# Patient Record
Sex: MALE | Race: BLACK OR AFRICAN AMERICAN | NOT HISPANIC OR LATINO | Employment: UNEMPLOYED | URBAN - METROPOLITAN AREA
[De-identification: names, ages, dates, MRNs, and addresses within clinical notes are randomized per-mention and may not be internally consistent; named-entity substitution may affect disease eponyms.]

---

## 2022-12-16 ENCOUNTER — HOSPITAL ENCOUNTER (EMERGENCY)
Facility: CLINIC | Age: 32
Discharge: PSYCHIATRIC OR SUBSTANCE USE FACILITY | End: 2022-12-19
Attending: EMERGENCY MEDICINE | Admitting: EMERGENCY MEDICINE
Payer: MEDICAID

## 2022-12-16 DIAGNOSIS — F29 PSYCHOSIS, UNSPECIFIED PSYCHOSIS TYPE (H): ICD-10-CM

## 2022-12-16 DIAGNOSIS — F19.10 DRUG ABUSE (H): ICD-10-CM

## 2022-12-16 LAB
BASOPHILS # BLD AUTO: 0 10E3/UL (ref 0–0.2)
BASOPHILS NFR BLD AUTO: 0 %
EOSINOPHIL # BLD AUTO: 0.1 10E3/UL (ref 0–0.7)
EOSINOPHIL NFR BLD AUTO: 1 %
ERYTHROCYTE [DISTWIDTH] IN BLOOD BY AUTOMATED COUNT: 15.9 % (ref 10–15)
HCT VFR BLD AUTO: 42.3 % (ref 40–53)
HGB BLD-MCNC: 14.3 G/DL (ref 13.3–17.7)
IMM GRANULOCYTES # BLD: 0 10E3/UL
IMM GRANULOCYTES NFR BLD: 0 %
LYMPHOCYTES # BLD AUTO: 1.6 10E3/UL (ref 0.8–5.3)
LYMPHOCYTES NFR BLD AUTO: 14 %
MCH RBC QN AUTO: 28.4 PG (ref 26.5–33)
MCHC RBC AUTO-ENTMCNC: 33.8 G/DL (ref 31.5–36.5)
MCV RBC AUTO: 84 FL (ref 78–100)
MONOCYTES # BLD AUTO: 0.5 10E3/UL (ref 0–1.3)
MONOCYTES NFR BLD AUTO: 5 %
NEUTROPHILS # BLD AUTO: 8.7 10E3/UL (ref 1.6–8.3)
NEUTROPHILS NFR BLD AUTO: 80 %
NRBC # BLD AUTO: 0 10E3/UL
NRBC BLD AUTO-RTO: 0 /100
PLATELET # BLD AUTO: 328 10E3/UL (ref 150–450)
RBC # BLD AUTO: 5.04 10E6/UL (ref 4.4–5.9)
WBC # BLD AUTO: 10.9 10E3/UL (ref 4–11)

## 2022-12-16 PROCEDURE — U0005 INFEC AGEN DETEC AMPLI PROBE: HCPCS | Performed by: EMERGENCY MEDICINE

## 2022-12-16 PROCEDURE — 99285 EMERGENCY DEPT VISIT HI MDM: CPT | Mod: 25

## 2022-12-16 PROCEDURE — C9803 HOPD COVID-19 SPEC COLLECT: HCPCS

## 2022-12-16 PROCEDURE — 258N000003 HC RX IP 258 OP 636: Performed by: EMERGENCY MEDICINE

## 2022-12-16 PROCEDURE — 93005 ELECTROCARDIOGRAM TRACING: CPT | Performed by: EMERGENCY MEDICINE

## 2022-12-16 PROCEDURE — 36415 COLL VENOUS BLD VENIPUNCTURE: CPT | Performed by: EMERGENCY MEDICINE

## 2022-12-16 PROCEDURE — 85025 COMPLETE CBC W/AUTO DIFF WBC: CPT | Performed by: EMERGENCY MEDICINE

## 2022-12-16 RX ORDER — SODIUM CHLORIDE 9 MG/ML
INJECTION, SOLUTION INTRAVENOUS CONTINUOUS
Status: DISCONTINUED | OUTPATIENT
Start: 2022-12-17 | End: 2022-12-19 | Stop reason: HOSPADM

## 2022-12-16 RX ADMIN — SODIUM CHLORIDE 1000 ML: 9 INJECTION, SOLUTION INTRAVENOUS at 23:49

## 2022-12-16 ASSESSMENT — ACTIVITIES OF DAILY LIVING (ADL): ADLS_ACUITY_SCORE: 33

## 2022-12-16 ASSESSMENT — ENCOUNTER SYMPTOMS
DYSURIA: 0
HEMATURIA: 0
DIARRHEA: 0
CONSTIPATION: 0
VOMITING: 0
DIFFICULTY URINATING: 0
NAUSEA: 0

## 2022-12-17 ENCOUNTER — TELEPHONE (OUTPATIENT)
Dept: BEHAVIORAL HEALTH | Facility: CLINIC | Age: 32
End: 2022-12-17

## 2022-12-17 LAB
ALBUMIN SERPL-MCNC: 3.7 G/DL (ref 3.5–5)
ALP SERPL-CCNC: 55 U/L (ref 45–120)
ALT SERPL W P-5'-P-CCNC: 19 U/L (ref 0–45)
AMPHETAMINES UR QL SCN: ABNORMAL
ANION GAP SERPL CALCULATED.3IONS-SCNC: 7 MMOL/L (ref 5–18)
APAP SERPL-MCNC: <3 UG/ML (ref 10–20)
AST SERPL W P-5'-P-CCNC: 26 U/L (ref 0–40)
ATRIAL RATE - MUSE: 75 BPM
BARBITURATES UR QL: ABNORMAL
BENZODIAZ UR QL: ABNORMAL
BILIRUB SERPL-MCNC: 0.6 MG/DL (ref 0–1)
BUN SERPL-MCNC: 13 MG/DL (ref 8–22)
CALCIUM SERPL-MCNC: 9.1 MG/DL (ref 8.5–10.5)
CANNABINOIDS UR QL SCN: ABNORMAL
CHLORIDE BLD-SCNC: 107 MMOL/L (ref 98–107)
CO2 SERPL-SCNC: 25 MMOL/L (ref 22–31)
COCAINE UR QL: ABNORMAL
CREAT SERPL-MCNC: 0.84 MG/DL (ref 0.7–1.3)
CREAT UR-MCNC: 596 MG/DL
DIASTOLIC BLOOD PRESSURE - MUSE: NORMAL MMHG
GFR SERPL CREATININE-BSD FRML MDRD: >90 ML/MIN/1.73M2
GLUCOSE BLD-MCNC: 104 MG/DL (ref 70–125)
INTERPRETATION ECG - MUSE: NORMAL
METHADONE UR QL SCN: ABNORMAL
OPIATES UR QL SCN: ABNORMAL
OXYCODONE UR QL: ABNORMAL
P AXIS - MUSE: 32 DEGREES
PCP UR QL SCN: ABNORMAL
POTASSIUM BLD-SCNC: 3.9 MMOL/L (ref 3.5–5)
PR INTERVAL - MUSE: 146 MS
PROT SERPL-MCNC: 6.8 G/DL (ref 6–8)
QRS DURATION - MUSE: 78 MS
QT - MUSE: 400 MS
QTC - MUSE: 446 MS
R AXIS - MUSE: 55 DEGREES
SARS-COV-2 RNA RESP QL NAA+PROBE: NEGATIVE
SODIUM SERPL-SCNC: 139 MMOL/L (ref 136–145)
SYSTOLIC BLOOD PRESSURE - MUSE: NORMAL MMHG
T AXIS - MUSE: 30 DEGREES
VENTRICULAR RATE- MUSE: 75 BPM

## 2022-12-17 PROCEDURE — 36415 COLL VENOUS BLD VENIPUNCTURE: CPT | Performed by: EMERGENCY MEDICINE

## 2022-12-17 PROCEDURE — 80053 COMPREHEN METABOLIC PANEL: CPT | Performed by: EMERGENCY MEDICINE

## 2022-12-17 PROCEDURE — 80143 DRUG ASSAY ACETAMINOPHEN: CPT | Performed by: EMERGENCY MEDICINE

## 2022-12-17 PROCEDURE — 90791 PSYCH DIAGNOSTIC EVALUATION: CPT

## 2022-12-17 PROCEDURE — 80307 DRUG TEST PRSMV CHEM ANLYZR: CPT | Performed by: EMERGENCY MEDICINE

## 2022-12-17 PROCEDURE — 250N000013 HC RX MED GY IP 250 OP 250 PS 637: Performed by: STUDENT IN AN ORGANIZED HEALTH CARE EDUCATION/TRAINING PROGRAM

## 2022-12-17 RX ADMIN — NICOTINE POLACRILEX 2 MG: 2 GUM, CHEWING ORAL at 21:06

## 2022-12-17 ASSESSMENT — COLUMBIA-SUICIDE SEVERITY RATING SCALE - C-SSRS
TOTAL  NUMBER OF ABORTED OR SELF INTERRUPTED ATTEMPTS LIFETIME: 3
LETHALITY/MEDICAL DAMAGE CODE MOST LETHAL ACTUAL ATTEMPT: MINOR PHYSICAL DAMAGE
4. HAVE YOU HAD THESE THOUGHTS AND HAD SOME INTENTION OF ACTING ON THEM?: YES
TOTAL  NUMBER OF ACTUAL ATTEMPTS LIFETIME: 1
TOTAL  NUMBER OF ABORTED OR SELF INTERRUPTED ATTEMPTS LIFETIME: YES
FIRST ATTEMPT DATE: 66459
LETHALITY/MEDICAL DAMAGE CODE MOST LETHAL POTENTIAL ATTEMPT: BEHAVIOR LIKELY TO RESULT IN INJURY BUT NOT LIKELY TO CAUSE DEATH
5. HAVE YOU STARTED TO WORK OUT OR WORKED OUT THE DETAILS OF HOW TO KILL YOURSELF? DO YOU INTEND TO CARRY OUT THIS PLAN?: YES
MOST LETHAL DATE: 66459
LETHALITY/MEDICAL DAMAGE CODE FIRST ACTUAL ATTEMPT: MINOR PHYSICAL DAMAGE
MOST RECENT DATE: 66458
TOTAL  NUMBER OF ACTUAL ATTEMPTS PAST 3 MONTHS: 1
2. HAVE YOU ACTUALLY HAD ANY THOUGHTS OF KILLING YOURSELF?: YES
TOTAL  NUMBER OF INTERRUPTED ATTEMPTS LIFETIME: NO
3. HAVE YOU BEEN THINKING ABOUT HOW YOU MIGHT KILL YOURSELF?: YES
6. HAVE YOU EVER DONE ANYTHING, STARTED TO DO ANYTHING, OR PREPARED TO DO ANYTHING TO END YOUR LIFE?: YES
LETHALITY/MEDICAL DAMAGE CODE FIRST POTENTIAL ATTEMPT: BEHAVIOR LIKELY TO RESULT IN INJURY BUT NOT LIKELY TO CAUSE DEATH
ATTEMPT LIFETIME: YES
1. IN THE PAST MONTH, HAVE YOU WISHED YOU WERE DEAD OR WISHED YOU COULD GO TO SLEEP AND NOT WAKE UP?: YES
TOTAL  NUMBER OF ABORTED OR SELF INTERRUPTED ATTEMPTS SINCE LAST CONTACT: 3
6. HAVE YOU EVER DONE ANYTHING, STARTED TO DO ANYTHING, OR PREPARED TO DO ANYTHING TO END YOUR LIFE?: YES
LETHALITY/MEDICAL DAMAGE CODE MOST RECENT ACTUAL ATTEMPT: MINOR PHYSICAL DAMAGE
4. HAVE YOU HAD THESE THOUGHTS AND HAD SOME INTENTION OF ACTING ON THEM?: YES
REASONS FOR IDEATION LIFETIME: MOSTLY TO END OR STOP THE PAIN (YOU COULDN'T GO ON LIVING WITH THE PAIN OR HOW YOU WERE FEELING)
5. HAVE YOU STARTED TO WORK OUT OR WORKED OUT THE DETAILS OF HOW TO KILL YOURSELF? DO YOU INTEND TO CARRY OUT THIS PLAN?: YES
LETHALITY/MEDICAL DAMAGE CODE MOST RECENT POTENTIAL ATTEMPT: BEHAVIOR LIKELY TO RESULT IN INJURY BUT NOT LIKELY TO CAUSE DEATH
TOTAL  NUMBER OF PREPARATORY ACTS PAST 3 MONTHS: 1
ATTEMPT PAST THREE MONTHS: YES
1. HAVE YOU WISHED YOU WERE DEAD OR WISHED YOU COULD GO TO SLEEP AND NOT WAKE UP?: YES
2. HAVE YOU ACTUALLY HAD ANY THOUGHTS OF KILLING YOURSELF?: YES
TOTAL  NUMBER OF PREPARATORY ACTS LIFETIME: 2
TOTAL  NUMBER OF ABORTED OR SELF INTERRUPTED ATTEMPTS PAST 3 MONTHS: YES
REASONS FOR IDEATION PAST MONTH: MOSTLY TO END OR STOP THE PAIN (YOU COULDN'T GO ON LIVING WITH THE PAIN OR HOW YOU WERE FEELING)

## 2022-12-17 ASSESSMENT — ACTIVITIES OF DAILY LIVING (ADL)
ADLS_ACUITY_SCORE: 35

## 2022-12-17 NOTE — ED NOTES
No emergency contacts noted in pt's file.  Writer called number pt provided to nurse for sister at [phone], however, call went right to voicemail.  Message left for family member to contact ED.  Chart indicates pt lives with sister.  ALPHONSE [place]

## 2022-12-17 NOTE — ED PROVIDER NOTES
EMERGENCY DEPARTMENT ENCOUNTER      NAME: Blue Hernandez Jr.  AGE: 32 year old male  YOB: 1990  MRN: [id number]  EVALUATION DATE & TIME: 12/16/2022 10:58 PM    PCP: No primary care provider on file.    ED PROVIDER: Mark Vasquez M.D.      Chief Complaint   Patient presents with     Altered Mental Status     Suicidal     Psychiatric Evaluation         FINAL IMPRESSION:  1. Psychosis, unspecified psychosis type (H)    2. Drug abuse (H)          ED COURSE & MEDICAL DECISION MAKING:    Pertinent Labs & Imaging studies reviewed. (See chart for details)  32 year old male presents to the Emergency Department for evaluation of psychosis.  Patient having worsening paranoia.  Feels like people are following him.  Did have a possible overdose yesterday.  Lab work-up is negative.  I do not think he took Tylenol.  He has no symptoms.  LFTs are normal.  Tylenol level is negative.  EKG is normal.  Patient medically cleared.  Seen by DEC .  They were able to talk to patient's sister as well as the patient.  At this time patient appears to be acutely psychotic.  Likely secondary to drug abuse.  Will need to be admitted.  Will find a mental health bed.  Patient will wait in the ER until a bed is available    11:01 PM I met with the patient to gather history and to perform my initial exam. I discussed the plan for care while in the Emergency Department. PPE: Face shield, N95, goggles  2:54 AM Spoke with DEC .     At the conclusion of the encounter I discussed the results of all of the tests and the disposition. The questions were answered. The patient or family acknowledged understanding and was agreeable with the care plan.         MEDICATIONS GIVEN IN THE EMERGENCY:  Medications   0.9% sodium chloride BOLUS (1,000 mLs Intravenous New Bag 12/16/22 2349)     Followed by   sodium chloride 0.9% infusion (has no administration in time range)       NEW PRESCRIPTIONS STARTED AT TODAY'S ER VISIT  New  "Prescriptions    No medications on file          =================================================================    HPI    Patient information was obtained from: patient    Use of : N/A         Blue Hernandez Jr. is a 32 year old male with no pertinent history who presents to this ED via self walk-in for evaluation of altered mental status.     Patient states that since the week of 10/20, he thinks that someone has been following him. He states that he doesn't know who is following him but he feels scared. He says that no one believes that someone is following him and it's \"driving him crazy.\" Yesterday at 10:30-11:30 PM, he took about 80 tablets of 30 mg Fentanyl. He says he had intentions of suicidal ideation. After taking the pills, he says he was sleepy after the incident. He states that he has been buying fentanyl on the street for the past 6 years (he is a daily user). He also says he uses cocaine as well. He denies nausea, vomiting, and change in bowel and urinary habits. There were no other concerns/complaints at this time.    SHx: He lives with his sister.      REVIEW OF SYSTEMS   Review of Systems   Gastrointestinal: Negative for constipation, diarrhea, nausea and vomiting.   Genitourinary: Negative for difficulty urinating, dysuria and hematuria.   Psychiatric/Behavioral: Positive for suicidal ideas.   All other systems reviewed and are negative.       PAST MEDICAL HISTORY:  History reviewed. No pertinent past medical history.    PAST SURGICAL HISTORY:  History reviewed. No pertinent surgical history.        CURRENT MEDICATIONS:    Current Facility-Administered Medications   Medication     sodium chloride 0.9% infusion     No current outpatient medications on file.         ALLERGIES:  No Known Allergies    FAMILY HISTORY:  History reviewed. No pertinent family history.    SOCIAL HISTORY:   Social History     Socioeconomic History     Marital status: Single       VITALS:  /72   " "Pulse 82   Temp 98.2  F (36.8  C) (Temporal)   Resp 18   Ht 1.778 m (5' 10\")   Wt 99.8 kg (220 lb)   SpO2 93%   BMI 31.57 kg/m      PHYSICAL EXAM    Physical Exam  Vitals and nursing note reviewed.   Constitutional:       General: He is not in acute distress.     Appearance: He is not diaphoretic.   HENT:      Head: Atraumatic.   Eyes:      General: No scleral icterus.     Pupils: Pupils are equal, round, and reactive to light.   Cardiovascular:      Rate and Rhythm: Normal rate and regular rhythm.      Heart sounds: Normal heart sounds.   Pulmonary:      Effort: No respiratory distress.      Breath sounds: Normal breath sounds.   Abdominal:      Palpations: Abdomen is soft.      Tenderness: There is no abdominal tenderness.   Musculoskeletal:         General: No tenderness.   Lymphadenopathy:      Cervical: No cervical adenopathy.   Skin:     General: Skin is warm.      Findings: No rash.           LAB:  All pertinent labs reviewed and interpreted.  Labs Ordered and Resulted from Time of ED Arrival to Time of ED Departure   ACETAMINOPHEN LEVEL - Abnormal       Result Value    Acetaminophen <3.0 (*)    CBC WITH PLATELETS AND DIFFERENTIAL - Abnormal    WBC Count 10.9      RBC Count 5.04      Hemoglobin 14.3      Hematocrit 42.3      MCV 84      MCH 28.4      MCHC 33.8      RDW 15.9 (*)     Platelet Count 328      % Neutrophils 80      % Lymphocytes 14      % Monocytes 5      % Eosinophils 1      % Basophils 0      % Immature Granulocytes 0      NRBCs per 100 WBC 0      Absolute Neutrophils 8.7 (*)     Absolute Lymphocytes 1.6      Absolute Monocytes 0.5      Absolute Eosinophils 0.1      Absolute Basophils 0.0      Absolute Immature Granulocytes 0.0      Absolute NRBCs 0.0     DRUGS OF ABUSE 1+ PANEL, URINE ([facility]) - Abnormal    Amphetamines Urine Screen Negative      Benzodiazepines Urine Screen Negative      Opiates Urine Screen Negative      PCP Urine Screen Negative      Cannabinoids Urine Screen " Positive (*)     Barbiturates Urine Screen Negative      Cocaine Urine Screen Positive (*)     Methadone Urine Screen Negative      Oxycodone Urine Screen Negative      Creatinine Urine mg/dL 596     COMPREHENSIVE METABOLIC PANEL - Normal    Sodium 139      Potassium 3.9      Chloride 107      Carbon Dioxide (CO2) 25      Anion Gap 7      Urea Nitrogen 13      Creatinine 0.84      Calcium 9.1      Glucose 104      Alkaline Phosphatase 55      AST 26      ALT 19      Protein Total 6.8      Albumin 3.7      Bilirubin Total 0.6      GFR Estimate >90     COVID-19 VIRUS (CORONAVIRUS) BY PCR - Normal    SARS CoV2 PCR Negative         RADIOLOGY:  Reviewed all pertinent imaging. Please see official radiology report.  No orders to display     EKG:    Performed at: 2327  Impression: Normal EKG.  No previous  Normal sinus rhythm ventricular to 75.  .  QRS 78.  QTc 446.    I have independently reviewed and interpreted the EKG(s) documented above.    I, Yajaira Ann, am serving as a scribe to document services personally performed by Dr. Mark Vasquez, based on my observation and the provider's statements to me. I, Mark Vasquez MD attest that Yajaira Ann is acting in a scribe capacity, has observed my performance of the services and has documented them in accordance with my direction.    Mark Vasquez M.D.  Emergency Medicine  [facility] EMERGENCY ROOM  [address] [address] [phone]  Dept: [phone]     Mark Vasquez MD  12/17/22 0414

## 2022-12-17 NOTE — PLAN OF CARE
Blue Hernandez Jr.  December 17, 2022  Plan of Care Hand-off Note     Patient Care Path: Inpatient Mental Health (with CD component)     Plan for Care:     Inpatient mental health with CD component for patient.  Patient has YOVANNY.  Patient however did write a suicide letter to father over last several days.  Patient's sister reports the patient's behavior is getting increasingly concerning.   For example, he was nailing the doors and windows of sister's home shut the other day.  Sister has a 5 year old child in home.   Patient's drug use is escalating.   There is family hx of schizophrenia (aunt) and possible, but undiagnosed, bipolar dx in mother.  Both patient biological parents have YOVANNY.       Critical Safety Issues: Patient reports he is afraid of being uncomfortable or in painful emotional withdrawals. Pt denies past seizures.  He says he has developed dependency on street drugs (Fentanyl, cocaine, cannabis).     Patient is convinced he is being followed by unknown persons.  Sister says patient has become increasingly paranoid.   Patient has NOT shown any aggression in Ed     Overview:  This patient is a child/adolescent: No    This patient has additional special visitor precautions: No    Legal Status: Voluntary    Contacts:   Sister Kyra     Psychiatry Consult:  Psychiatry Consult not requested because not seen as needed at this time     Updated Attending Provider regarding plan of care.    Jenn Rivera, AYLEENSW

## 2022-12-17 NOTE — ED TRIAGE NOTES
"Pt presents to the ED with c/o being followed. Pt has been followed for 2 months now. Pt feels like he is hearing things. Pt does use \"cocaine and perc 30\". Per pt - he took 80 \"perc 30\"  yesterday in attempt to end his life.       "

## 2022-12-17 NOTE — PHARMACY-ADMISSION MEDICATION HISTORY
Pharmacy Note - Admission Medication History  Pertinent Provider Information:    Patient said he doesn't take any medications.      ______________________________________________________________________    Prior To Admission (PTA) med list completed and updated in EMR.       No outpatient medications have been marked as taking for the 12/16/22 encounter (Hospital Encounter).       Information source(s): Patient  Method of interview communication: in-person      Patient was asked about OTC/herbal products specifically.  PTA med list reflects this.      Allergies were reviewed, assessed, and updated with the patient.      Patient does not use any multi-dose medications prior to admission.    The information provided in this note is only as accurate as the sources available at the time of the update(s).    Thank you for the opportunity to participate in the care of this patient.    Claudia Oconnell RPH  12/17/2022 1:15 PM

## 2022-12-17 NOTE — ED NOTES
[facility] ED Mental Health Handoff Note:     Assuming care from: Dr Opal Jean    Brief HPI: 32 year old male signed out to me by the above provider. See initial ED Provider note for full details of the presentation.   In brief, patient presents for evaluation of psychosis with worsening paranoia.  Patient overdosed on fentanyl he obtained on the street 12/16, he has been medically cleared.       Acute psychosis, paranoia, DEC saw yesterday, nothing in past 8 hours    Home meds reviewed and ordered/administered: Yes  Medically stable for inpatient mental health admission: Yes.  Evaluated by mental health: Yes. The recommendation is for inpatient mental health treatment. Bed search in process  Safety concerns: At the time I received sign out, there were no safety concerns.    Hold Status:  Active Orders   N/A       ED Course:  There were no significant events during my shift.  Ordered patient some nicotine gum.    Patient was signed out to the oncoming provider, Dr. Mark Vasquez at shift change    Impression:    ICD-10-CM    1. Psychosis, unspecified psychosis type (H)  F29       2. Drug abuse (H)  F19.10           Plan:    1. Awaiting inpatient mental health admission/transfer.    Ceasar Lewis MD  [facility] EMERGENCY ROOM  [facility] [facility] [address] [phone]                 Ceasar Lewis MD  12/17/22 6330

## 2022-12-17 NOTE — ED PROVIDER NOTES
ED Behavioral Health Patient Transition of Care Note    Assuming care from:  Kaleb Vasquez MD    Brief history: Patient presents for evaluation of psychosis with worsening paranoia.  Patient overdosed on fentanyl he obtained on the street yesterday, he has been medically cleared.    Any outstanding medical concerns:  awaiting lab results - N/A, awaiting imaging results - N/A, reevaluate after treatment - N/A and risk for withdrawal syndrome    Has SW seen the patient:  yes    Is the patient on a hold:  voluntary but holdable    Current plan for disposition:  SW attempting to find a bed    Safety concerns:  pt has been chemically restrained with N/A, pt has been in physical restraints and pt has been aggressive or combative    Dietary restrictions:  None, pt can eat and drink ad lubna    Have daily medications been ordered:  None    Significant events during shift:  None         1. Psychosis, unspecified psychosis type (H)    2. Drug abuse (H)               Opal Jean MD  12/17/22 0587

## 2022-12-17 NOTE — TELEPHONE ENCOUNTER
R:  Patient cleared and ready for behavioral bed placement: Yes       No appropriate beds currently available within the  system. Bed search update 7:00am-330pm    No availabilioty at [facility], [facility], [facility], [place], or [facility].     Pt to remain on worklist pending bed availability for review

## 2022-12-17 NOTE — TELEPHONE ENCOUNTER
S: ALPHONSE Gates  Jenn calling at 0257AM about a 32 year old/Male presenting with Paranoia and SI    B: Pt arrived via Self . Presenting problem, stressors: Pt was nailing the doors and windows shut as he is paranoid that people are going to harm him. Pt lives with sister, who report that pt had written a suicide letter to dad. Pt reports he intentionally overdosed on 80 pills of fentanyl yesterday.     Pt endorses SI, no plan  Pt denies SIB  Pt denies HI   Pt endorses auditory hallucinations .     Pt affect in ED: calm cooperative  Pt Dx: No prior MH history  Previous [facility] hx? No  Hx of suicide attempt? No    Hx of aggression/violence, sexual offences, legal concerns, or Epic care plan? describe: No  Current concerns for aggression this visit? No    Pt is not prescribed medication.   Is patient medication compliant? N/A  Pt denies OP services   CD concerns: Actively using/consuming fentanyl, cocaine and cannabis  Acute or chronic medical concerns: No    Does Pt present with specific needs, assistive devices, or exclusionary criteria? None    Does pt have a history of Civil Commitment? No  Is Pt their own guardian? Yes  Pt is ambulatory  Pt is able to perform ADLs independently      A: Pt meets criteria for review for IP admission. Pt is Voluntary  Preferred placement: [facility]    COVID:Negative  Utox: Positive for Cannabis and cocaine   CMP: WNL  CBC: Abnormalities: RDW 15.9 / Abs Neutrohpils 8  HCG: N/A    R: Patient cleared and ready for behavioral bed placement: Yes  Pt placed on IPMH worklist? Yes     No appropriate beds currently available within the  system. Bed search update @        [facility]: @ cap per website. Per Karli @ 00:07 they do not have beds available tonight     [facility]: @ cap per website      [facility]: @ cap per website. Per previous call made by this writer, facility closed until at least Monday d/t Covid outbreak.       [facility]: @ cap per website      [facility]: @  cap per website      [facility]: @ cap per website    Pt remains on wait list pending appropriate placement availability

## 2022-12-17 NOTE — ED NOTES
"PT states there are people following him in a car when he goes out of the house everyday. The PT states he is unable to identify who they are because \"they never get close enough for me to see their faces our the license plate on the car\". The The writer asked the PT does he have reason to believe people would want to harm him and he stated \"not that I know of\". He stated he lives at home with his sister whom he feels safe around. He stated recently he has called the police due to hearing someone walking in what he thinks is the attic of the home. He has called the police a few times and he stated the police tell him perhaps it is \"animals\" in the attic. The PT stated his sister denies hearing things in the attic. He admits to taking 10-20 pills of street fentanyl a day as well as using cocaine daily. The PT denies marijuana or alcohol use. The PT states he took  80 pills of fentanyl yesterday in an attempt to end his life.   "
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
## 2022-12-17 NOTE — ED NOTES
Pt roomed. Charge RN informed of the need for a 1:1. The writer will remain with the patient until the charge RN is able to find a 1:1 tech to sit in the room.

## 2022-12-18 ENCOUNTER — TELEPHONE (OUTPATIENT)
Dept: BEHAVIORAL HEALTH | Facility: CLINIC | Age: 32
End: 2022-12-18

## 2022-12-18 ASSESSMENT — ACTIVITIES OF DAILY LIVING (ADL)
ADLS_ACUITY_SCORE: 35

## 2022-12-18 NOTE — ED NOTES
BERKLEY Ferrara  connected with patient via [facility].  Breakfast ordered.  Melita aSuer RN 12/18/2022 9:55 AM

## 2022-12-18 NOTE — ED NOTES
Per provider patient was prescribed nicotine gum and was allowed to use  to charge his video game in his room.

## 2022-12-18 NOTE — ED NOTES
Patient up to use bathroom. Patient states that he is wondering what is going on and what the plan is. He is concerned and feels forgotten about and is worried about getting discharged and going home and the issues continuing about seeing and hearing events. Patient is also requesting to go smoke. Provider will be notified about nicotine needs and charge nurse will be made aware of patients concerns and future plan.
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
## 2022-12-18 NOTE — ED PROVIDER NOTES
2:12 PM.  Psychiatric patient signed out to me.  Patient presenting with paranoia and psychosis which is still quite severe.  Patient overdosed on street fentanyl but now medically cleared.  DEC  is looking for a psychiatric bed for admission.  Patient remains voluntary but holdable.  If the patient is still here tonight, patient will be signed out to the night ED physician at 10 PM.  Drug screen positive for cannabinoids and cocaine.     Mitchell Lopez MD  12/18/22 1413       Mitchell Lopez MD  12/18/22 1911

## 2022-12-18 NOTE — ED NOTES
[facility] ED Mental Health Handoff Note:     Assuming care from: Dr Ceasar Lewis    Brief HPI: 32 year old male signed out to me by the above provider. See initial ED Provider note for full details of the presentation.   In brief, patient presents for evaluation of psychosis with worsening paranoia.  Patient overdosed on fentanyl he obtained on the street 12/16, he has been medically cleared.      I, Miley James, am serving as a scribe to document services personally performed by Dr. Vasquez, based on my observations and the provider's statements to me.  I, Dr. Vasquez, attest that Miley James is acting in a scribe capacity, has observed my performance of the services and has documented them in accordance with my direction.    Home meds reviewed and ordered/administered: Yes  Medically stable for inpatient mental health admission: Yes.  Evaluated by mental health: Yes. The recommendation is for inpatient mental health treatment. Bed search in process  Safety concerns: At the time I received sign out, there were no safety concerns.    Hold Status:  Active Orders   N/A       Labs/Imaging:   Results for orders placed or performed during the hospital encounter of 12/16/22 (from the past 24 hour(s))   ECG 12-LEAD WITH MUSE ([facility])     Status: None    Collection Time: 12/16/22 11:27 PM   Result Value Ref Range    Systolic Blood Pressure  mmHg    Diastolic Blood Pressure  mmHg    Ventricular Rate 75 BPM    Atrial Rate 75 BPM    CT Interval 146 ms    QRS Duration 78 ms     ms    QTc 446 ms    P Axis 32 degrees    R AXIS 55 degrees    T Axis 30 degrees    Interpretation ECG       Sinus rhythm  Normal ECG  No previous ECGs available  Confirmed by SEE ED PROVIDER NOTE FOR, ECG INTERPRETATION ([id number]),  ROXANNA CHANDLER ([id number]) on 12/17/2022 1:28:24 AM     CBC with platelets differential     Status: Abnormal    Collection Time: 12/16/22 11:46 PM    Narrative    The following orders were created for panel order CBC  with platelets differential.  Procedure                               Abnormality         Status                     ---------                               -----------         ------                     CBC with platelets and d...[[id number]]  Abnormal            Final result                 Please view results for these tests on the individual orders.   CBC with platelets and differential     Status: Abnormal    Collection Time: 12/16/22 11:46 PM   Result Value Ref Range    WBC Count 10.9 4.0 - 11.0 10e3/uL    RBC Count 5.04 4.40 - 5.90 10e6/uL    Hemoglobin 14.3 13.3 - 17.7 g/dL    Hematocrit 42.3 40.0 - 53.0 %    MCV 84 78 - 100 fL    MCH 28.4 26.5 - 33.0 pg    MCHC 33.8 31.5 - 36.5 g/dL    RDW 15.9 (H) 10.0 - 15.0 %    Platelet Count 328 150 - 450 10e3/uL    % Neutrophils 80 %    % Lymphocytes 14 %    % Monocytes 5 %    % Eosinophils 1 %    % Basophils 0 %    % Immature Granulocytes 0 %    NRBCs per 100 WBC 0 <1 /100    Absolute Neutrophils 8.7 (H) 1.6 - 8.3 10e3/uL    Absolute Lymphocytes 1.6 0.8 - 5.3 10e3/uL    Absolute Monocytes 0.5 0.0 - 1.3 10e3/uL    Absolute Eosinophils 0.1 0.0 - 0.7 10e3/uL    Absolute Basophils 0.0 0.0 - 0.2 10e3/uL    Absolute Immature Granulocytes 0.0 <=0.4 10e3/uL    Absolute NRBCs 0.0 10e3/uL   Asymptomatic COVID-19 Virus (Coronavirus) by PCR Nasopharyngeal     Status: Normal    Collection Time: 12/16/22 11:47 PM    Specimen: Nasopharyngeal; Swab   Result Value Ref Range    SARS CoV2 PCR Negative Negative    Narrative    Testing was performed using the Xpert Xpress SARS-CoV-2 Assay on the Cepheid Gene-Xpert Instrument Systems. Additional information about this Emergency Use Authorization (EUA) assay can be found via the Lab Guide. This test should be ordered for the detection of SARS-CoV-2 in individuals who meet SARS-CoV-2 clinical and/or epidemiological criteria as well as from individuals without symptoms or other reasons to suspect COVID-19. Test performance for asymptomatic  patients has only been established in anterior nasal swab specimens. This test is for in vitro diagnostic use under the FDA EUA for laboratories certified under CLIA to perform high complexity testing. This test has not been FDA cleared or approved. A negative result does not rule out the presence of PCR inhibitors in the specimen or target RNA concentration below the limit of detection for the assay. The possibility of a false negative should be considered if the patient's recent exposure or clinical presentation suggests COVID-19. This test was validated by the [facility]. This laboratory is certified under the Clinical Laboratory Improvement Amendments (CLIA) as qualified to perform high complexity laboratory testing.     Comprehensive metabolic panel     Status: Normal    Collection Time: 12/17/22  1:17 AM   Result Value Ref Range    Sodium 139 136 - 145 mmol/L    Potassium 3.9 3.5 - 5.0 mmol/L    Chloride 107 98 - 107 mmol/L    Carbon Dioxide (CO2) 25 22 - 31 mmol/L    Anion Gap 7 5 - 18 mmol/L    Urea Nitrogen 13 8 - 22 mg/dL    Creatinine 0.84 0.70 - 1.30 mg/dL    Calcium 9.1 8.5 - 10.5 mg/dL    Glucose 104 70 - 125 mg/dL    Alkaline Phosphatase 55 45 - 120 U/L    AST 26 0 - 40 U/L    ALT 19 0 - 45 U/L    Protein Total 6.8 6.0 - 8.0 g/dL    Albumin 3.7 3.5 - 5.0 g/dL    Bilirubin Total 0.6 0.0 - 1.0 mg/dL    GFR Estimate >90 >60 mL/min/1.73m2   Acetaminophen level     Status: Abnormal    Collection Time: 12/17/22  1:17 AM   Result Value Ref Range    Acetaminophen <3.0 (L) 10.0 - 20.0 ug/mL   Urine Drugs of Abuse Screen Panel 1+ - Drug Screen plus Methadone     Status: Abnormal    Collection Time: 12/17/22  1:17 AM    Narrative    The following orders were created for panel order Urine Drugs of Abuse Screen Panel 1+ - Drug Screen plus Methadone.  Procedure                               Abnormality         Status                     ---------                                -----------         ------                     Drugs of Abuse 1+ Panel,...[[id number]]  Abnormal            Final result                 Please view results for these tests on the individual orders.   Drugs of Abuse 1+ Panel, Urine ([facility] Only)     Status: Abnormal    Collection Time: 12/17/22  1:17 AM   Result Value Ref Range    Amphetamines Urine Screen Negative Screen Negative    Benzodiazepines Urine Screen Negative Screen Negative    Opiates Urine Screen Negative Screen Negative    PCP Urine Screen Negative Screen Negative    Cannabinoids Urine Screen Positive (A) Screen Negative    Barbiturates Urine Screen Negative Screen Negative    Cocaine Urine Screen Positive (A) Screen Negative    Methadone Urine Screen Negative Screen Negative    Oxycodone Urine Screen Negative Screen Negative    Creatinine Urine mg/dL 596 mg/dL    Narrative    Drug                           Screening Threshold    Amphetamines                    1000 ng/mL  Benzodiazepine                   200 ng/mL  Opiates                          300 ng/mL  Phencyclidine                     25 ng/mL  THC Metabolite                    50 ng/mL  Barbiturates                     200 ng/mL  Cocaine Metabolite               150 ng/mL  Methadone                        300 ng/mL  Oxycodone                        100 ng/mL    Screening results are to be used only for medical purposes.  Unconfirmed screening results are not to be used for non-  medical purposes.         ED Meds:  Medications   0.9% sodium chloride BOLUS (1,000 mLs Intravenous New Bag 12/16/22 0851)     Followed by   sodium chloride 0.9% infusion ( Intravenous Not Given 12/17/22 7859)   nicotine (NICORETTE) gum 2 mg (2 mg Buccal Given 12/17/22 5678)     No orders to display       ED Course:       There were no significant events during my shift.    Patient was signed out to the oncoming provider, Dr. Opal Jean at shift change    Impression:    ICD-10-CM    1. Psychosis, unspecified  psychosis type (H)  F29       2. Drug abuse (H)  F19.10           Plan:    1. Awaiting inpatient mental health admission/transfer.        Mark Vasquez MD  [facility] EMERGENCY ROOM  [address] [facility] [id number]  [phone]                   Mark Vasquez MD  12/18/22 0615

## 2022-12-18 NOTE — TELEPHONE ENCOUNTER
No appropriate beds currently available within the  system. Bed search update ([facility]) @ 12:40AM:       [facility]: @ cap per website. Per call @ 00:06 they are @ capacity.   [facility]: @ cap per website      [facility]: @ cap per website. Per Lina @ 00:07 they are full and closed until 12/20 d/t Covid.      [facility]: @ cap per website      [facility]: @ cap per website      [facility]: @ cap per website  [facility]: @ cap per website and has extensive wait list, per previous calls made by this writer.       [facility]: @ cap per website    Pt remains on work list until appropriate placement is available

## 2022-12-18 NOTE — TELEPHONE ENCOUNTER
R:  No appropriate beds within [facility].  Bed Search update within [facility] 8:50PM  [facility]- No beds available- Spoke to [facility].  No beds.  [facility]-No beds available  [facility]-No beds available.  Spoke to [facility].  No beds until at least Tuesday.    [facility]-No beds available  [facility]- No beds available  [facility]-No beds available  [facility]-No beds available  [facility]-No beds available     Pt remains on work list awaiting appropriate placement.

## 2022-12-18 NOTE — ED NOTES
[facility] ED Mental Health Handoff Note:     Assuming care from: Dr Mark Vasquez    Brief HPI: 32 year old male signed out to me by the above provider. See initial ED Provider note for full details of the presentation.   In brief, patient presents for evaluation of psychosis with worsening paranoia.  Patient overdosed on fentanyl he obtained on the street 12/16, he has been medically cleared.    I, Iam Lane, am serving as a scribe to documentservices personally performed by Dr. Opal Jean MD, based on my observation and the provider's statements to me. I, Opal Jean MD, attest that Iam Lane is acting in a scribe capacity, has observed my performance of the services and has documented them in accordance with my direction.    Home meds reviewed and ordered/administered: Yes  Medically stable for inpatient mental health admission: Yes.  Evaluated by mental health: Yes. The recommendation is for inpatient mental health treatment. Bed search in process  Safety concerns: At the time I received sign out, there were no safety concerns.    Hold Status:  Active Orders   N/A       Labs/Imaging:   No results found for this visit on 12/16/22 (from the past 24 hour(s)).      ED Meds:  Medications   0.9% sodium chloride BOLUS (1,000 mLs Intravenous New Bag 12/16/22 0097)     Followed by   sodium chloride 0.9% infusion ( Intravenous Not Given 12/17/22 8192)   nicotine (NICORETTE) gum 2 mg (2 mg Buccal Given 12/17/22 1126)     No orders to display       ED Course:     10:44 AM Discussed with Jos[facility] with [facility]. He says patient is still paranoid and has poor insight. He is still agreeable to admission. For validation would still recommend admission.    There were no significant events during my shift.    Patient was signed out to the oncoming provider, Dr. Mitchell Lopez at shift change    Impression:    ICD-10-CM    1. Psychosis, unspecified psychosis type (H)  F29       2. Drug abuse (H)   F19.10           Plan:    1. Awaiting inpatient mental health admission/transfer.    Opal Jean MD  [facility] EMERGENCY ROOM  [facility] [facility] [id number] [phone]                   Opal Jean MD  12/18/22 0563

## 2022-12-19 ENCOUNTER — HOSPITAL ENCOUNTER (INPATIENT)
Facility: CLINIC | Age: 32
LOS: 3 days | Discharge: HOME | End: 2022-12-22
Attending: PSYCHIATRY & NEUROLOGY | Admitting: PSYCHIATRY & NEUROLOGY
Payer: MEDICAID

## 2022-12-19 VITALS
TEMPERATURE: 98.2 F | DIASTOLIC BLOOD PRESSURE: 64 MMHG | HEIGHT: 70 IN | WEIGHT: 220 LBS | HEART RATE: 80 BPM | BODY MASS INDEX: 31.5 KG/M2 | SYSTOLIC BLOOD PRESSURE: 113 MMHG | OXYGEN SATURATION: 96 % | RESPIRATION RATE: 16 BRPM

## 2022-12-19 DIAGNOSIS — F14.959: Primary | ICD-10-CM

## 2022-12-19 PROCEDURE — 250N000013 HC RX MED GY IP 250 OP 250 PS 637: Performed by: NURSE PRACTITIONER

## 2022-12-19 PROCEDURE — 124N000002 HC R&B MH UMMC

## 2022-12-19 PROCEDURE — 99223 1ST HOSP IP/OBS HIGH 75: CPT | Mod: AI | Performed by: NURSE PRACTITIONER

## 2022-12-19 RX ORDER — IBUPROFEN 600 MG/1
600 TABLET, FILM COATED ORAL EVERY 6 HOURS PRN
Status: DISCONTINUED | OUTPATIENT
Start: 2022-12-19 | End: 2022-12-22 | Stop reason: HOSPADM

## 2022-12-19 RX ORDER — OLANZAPINE 5 MG/1
5 TABLET ORAL AT BEDTIME
Status: DISCONTINUED | OUTPATIENT
Start: 2022-12-19 | End: 2022-12-21

## 2022-12-19 RX ORDER — LOPERAMIDE HCL 2 MG
2 CAPSULE ORAL 4 TIMES DAILY PRN
Status: DISCONTINUED | OUTPATIENT
Start: 2022-12-19 | End: 2022-12-22 | Stop reason: HOSPADM

## 2022-12-19 RX ORDER — OLANZAPINE 10 MG/1
10 TABLET ORAL 3 TIMES DAILY PRN
Status: DISCONTINUED | OUTPATIENT
Start: 2022-12-19 | End: 2022-12-22 | Stop reason: HOSPADM

## 2022-12-19 RX ORDER — MAGNESIUM HYDROXIDE/ALUMINUM HYDROXICE/SIMETHICONE 120; 1200; 1200 MG/30ML; MG/30ML; MG/30ML
30 SUSPENSION ORAL EVERY 4 HOURS PRN
Status: DISCONTINUED | OUTPATIENT
Start: 2022-12-19 | End: 2022-12-22 | Stop reason: HOSPADM

## 2022-12-19 RX ORDER — ACETAMINOPHEN 325 MG/1
650 TABLET ORAL EVERY 4 HOURS PRN
Status: DISCONTINUED | OUTPATIENT
Start: 2022-12-19 | End: 2022-12-22 | Stop reason: HOSPADM

## 2022-12-19 RX ORDER — AMOXICILLIN 250 MG
1 CAPSULE ORAL 2 TIMES DAILY PRN
Status: DISCONTINUED | OUTPATIENT
Start: 2022-12-19 | End: 2022-12-22 | Stop reason: HOSPADM

## 2022-12-19 RX ORDER — ONDANSETRON 4 MG/1
4 TABLET, ORALLY DISINTEGRATING ORAL EVERY 6 HOURS PRN
Status: DISCONTINUED | OUTPATIENT
Start: 2022-12-19 | End: 2022-12-22 | Stop reason: HOSPADM

## 2022-12-19 RX ORDER — OLANZAPINE 10 MG/2ML
10 INJECTION, POWDER, FOR SOLUTION INTRAMUSCULAR 3 TIMES DAILY PRN
Status: DISCONTINUED | OUTPATIENT
Start: 2022-12-19 | End: 2022-12-22 | Stop reason: HOSPADM

## 2022-12-19 RX ORDER — HYDROXYZINE HYDROCHLORIDE 25 MG/1
25 TABLET, FILM COATED ORAL EVERY 4 HOURS PRN
Status: DISCONTINUED | OUTPATIENT
Start: 2022-12-19 | End: 2022-12-22 | Stop reason: HOSPADM

## 2022-12-19 RX ORDER — QUETIAPINE FUMARATE 50 MG/1
50 TABLET, FILM COATED ORAL
Status: DISCONTINUED | OUTPATIENT
Start: 2022-12-19 | End: 2022-12-21

## 2022-12-19 RX ADMIN — QUETIAPINE FUMARATE 50 MG: 50 TABLET ORAL at 21:34

## 2022-12-19 RX ADMIN — OLANZAPINE 5 MG: 5 TABLET, FILM COATED ORAL at 21:31

## 2022-12-19 ASSESSMENT — ACTIVITIES OF DAILY LIVING (ADL)
ADLS_ACUITY_SCORE: 35
CHANGE_IN_FUNCTIONAL_STATUS_SINCE_ONSET_OF_CURRENT_ILLNESS/INJURY: NO
DRESSING/BATHING_DIFFICULTY: NO
WEAR_GLASSES_OR_BLIND: NO
ADLS_ACUITY_SCORE: 35
ADLS_ACUITY_SCORE: 35
TOILETING_ISSUES: NO
ADLS_ACUITY_SCORE: 35
ADLS_ACUITY_SCORE: 28
DIFFICULTY_COMMUNICATING: NO
ADLS_ACUITY_SCORE: 28
ADLS_ACUITY_SCORE: 35
ADLS_ACUITY_SCORE: 35
HEARING_DIFFICULTY_OR_DEAF: NO
ADLS_ACUITY_SCORE: 28
DOING_ERRANDS_INDEPENDENTLY_DIFFICULTY: NO
WALKING_OR_CLIMBING_STAIRS_DIFFICULTY: NO
CONCENTRATING,_REMEMBERING_OR_MAKING_DECISIONS_DIFFICULTY: NO
ADLS_ACUITY_SCORE: 28
FALL_HISTORY_WITHIN_LAST_SIX_MONTHS: NO
DIFFICULTY_EATING/SWALLOWING: NO
ADLS_ACUITY_SCORE: 28

## 2022-12-19 NOTE — ED NOTES
[facility] ED Mental Health Handoff Note:     Assuming care from: Dr Mitchell Lopez    Brief HPI: 32 year old male signed out to me by the above provider. See initial ED Provider note for full details of the presentation.   In brief,  patient presents for evaluation of psychosis with worsening paranoia.  Patient overdosed on fentanyl he obtained on the street 12/16, he has been medically cleared.      Home meds reviewed and ordered/administered: Yes  Medically stable for inpatient mental health admission: Yes.  Evaluated by mental health: Yes. The recommendation is for inpatient mental health treatment. Bed search in process  Safety concerns: At the time I received sign out, there were no safety concerns.    Hold Status:  Active Orders   N/A       Labs/Imaging:   No results found for this visit on 12/16/22 (from the past 24 hour(s)).      ED Meds:  Medications   0.9% sodium chloride BOLUS (1,000 mLs Intravenous New Bag 12/16/22 3544)     Followed by   sodium chloride 0.9% infusion ( Intravenous Not Given 12/17/22 9436)   nicotine (NICORETTE) gum 2 mg (2 mg Buccal Given 12/17/22 9414)     No orders to display       ED Course:       There were no significant events during my shift.    Patient was signed out to the oncoming provider, Dr. Kali Seymour at shift change    Impression:    ICD-10-CM    1. Psychosis, unspecified psychosis type (H)  F29       2. Drug abuse (H)  F19.10           Plan:    1. Awaiting inpatient mental health admission/transfer.    Albaro Larsen MD  [facility] EMERGENCY ROOM  [address] [place] [id number] [phone]                   Chaparro Larsen MD  12/19/22 0604
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
## 2022-12-19 NOTE — ED NOTES
[facility] ED Mental Health Handoff Note:     Assuming care from: Dr Albaro Larsen    Brief HPI: 32 year old male signed out to me by the above provider. See initial ED Provider note for full details of the presentation.   In brief, patient reports he is being followed and yesterday took 80 tablets of 30 mg fentanyl. Patient reports suicidal ideation. Patient is currently in the ED voluntarily. Patient has been medically cleared after his overdose. Patient is waiting for bed placement.     Home meds reviewed and ordered/administered: Yes  Medically stable for inpatient mental health admission: Yes.  Evaluated by mental health: Yes. The recommendation is for inpatient mental health treatment. Bed search in process  Safety concerns: At the time I received sign out, there were no safety concerns.    Hold Status:  Active Orders   N/A       Labs/Imaging:   No results found for this visit on 12/16/22 (from the past 24 hour(s)).      ED Meds:  Medications   0.9% sodium chloride BOLUS (1,000 mLs Intravenous New Bag 12/16/22 3682)     Followed by   sodium chloride 0.9% infusion ( Intravenous Not Given 12/17/22 0746)   nicotine (NICORETTE) gum 2 mg (2 mg Buccal Given 12/17/22 7177)     No orders to display       ED Course:  There were no significant events during my shift.  The patient was accepted to [facility].      Impression:    ICD-10-CM    1. Psychosis, unspecified psychosis type (H)  F29       2. Drug abuse (H)  F19.10           Plan:    1. Admitted/transferred to inpatient mental health bed.    Kali Seymour MD  [facility] EMERGENCY ROOM  [address] [place] [id number] [phone]                   Kali Seymour,   12/19/22 3526

## 2022-12-19 NOTE — H&P
History and Physical    Blue Hernandez Jr. 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 Parks, under the direct care of Mame Serrano NP    He was admitted as a voluntary patient.  He requested discharge during the admission assessment and was placed on a 72-hour hold 12/19/2022 at 1415.     Routine lab studies have been requested.    Monitor for target symptoms.     Provide a safe environment and therapeutic milieu.     Opiate withdrawal scale and vital signs.    Medications:  He states he will not take medications.  Will offer Zyprexa 5 mg at HS.  PRNs of Zyprexa, Seroquel and Hydroxyzine are available.      He does not have any outpatient providers.  He said that he wants to stop using drugs and is considering CD treatment.       Attestation:  Patient has been seen and evaluated by me, Olya Serrano, NATALY CNP  The patient was counseled on nature of illness and treatment plan/options  Care was coordinated with treatment team       Clinical Global Impressions  First:  Considering your total clinical experience with this particular patient population, how severe are the patient's symptoms at this time?: 6 (12/19/22 1411)  Compared to the patient's condition at the START of treatment, this patient's condition is: 4 (12/19/22 1411)  Most recent:  Considering your total clinical experience with this particular patient population, how severe are the patient's symptoms at this time?: 6 (12/19/22 1411)  Compared to the patient's condition at the START of treatment, this patient's condition is: 4 (12/19/22  "1411)           Chief Complaint:     History is obtained from the patient and electronic health record.    \"I was hearing things and I guess I just wanted to be safe.\"           History of Present Illness:        Harry \"Marcio\" David Simon Is a 32-year-old male admitted to [facility] on 12/19/2022.  He was admitted as a voluntary patient through the [facility] ER due to substance abuse, suicidal ideation, auditory hallucinations and paranoia.  Per ER notes, he wrote a suicide letter to his father and reported that he overdosed on Fentanyl with suicidal intent on 12/16.  Per ER physician notes, \"Yesterday (12/16) at 10:30 - 11:30 PM, he took about 80 tablets of 30 mg Fentanyl.  He says he had intentions of suicidal ideation.\"  He reported suicidal intent with his overdose to multiple ER staff.  He lives with his sister and her 5-year-old daughter.  His sister provided collateral information and said that he nailed shut the doors and windows on her home.  He told an ER staff member that he called the police a few times due to believing there was someone in the home and police suggested that perhaps he was hearing animals in the attic.  UTOX was positive for cocaine and cannabinoids.  In the ER he admitted to use of cannabis, cocaine and Fentanyl.  He was not taking any medications prior to admission.    During the admission assessment, he said much of the above information is untrue and his sister is retaliating because the patient told their mother his sister lost her job while their mother was in town for [employer].  He said that he did not nail shut the windows and doors at his sister's home.  He does admit to writing a suicide not to his father.  He states that he did not overdose on Fentanyl with suicidal intent and that he only used his typical amount.  He said he has been smoking and snorting Fentanyl for 6 years.  He reports he was using 10-20 Fentanyl pills per day " "prior to admission.  His last use was 12/16 and he does not appear to be in withdrawal at this time.  He says he started using cocaine about 1 year ago and after this he began experiencing auditory hallucinations.  He said he has been snorting cocaine once per day.  He reports he used cannabis from ages 14 to 27 and says he hasn't used it since, and that his UTOX was positive because he was in the vicinity of people who were using.  He stated that he came to the hospital voluntarily and that he wants to leave because he is \"not crazy.\"  He said he wants to stop using and is considering CD treatment and thought he was being admitted to a CD treatment program, not a psychiatric unit.  Discussed that he is having psychotic symptoms, possibly related to substance use, but he refuses to believe this.  He states he will not take medications.  Provider then placed a 72-hour hold.             Psychiatric Review of Systems:      He reports that he \"never\" has suicidal ideation and that he wrote a suicide note \"because I was being followed\" and was considering killing himself before someone else could kill him.  He denies intent/plan.  He says he is \"not sure\" who would be following him.  He speculates it may be a \"lady I was messing around with\" because \"she texted me out of nowhere asking if I was okay.\"  He also speculates some enemies of a friend who stored belongings in his garage may have mistaken him for his friend.  He reports that he has witnessed people following him in cars \"every day for the past 2 months.\"  He reports that these vehicles are different each day.  He reports auditory hallucinations of whistling.  He denies visual hallucinations.  He denies ideas of reference and thought broadcasting.  He reports his mood is low due to concerns about being followed.  He reports that he was sleeping poorly while using cocaine but that his sleep the last 3 nights has been \"great.\"  He wasn't eating much while he was " "using cocaine and his appetite is now \"great.\"  He says he only lost 3-4# over the past year.  His concentration is \"great.\"  His energy level is \"spot on since I haven't been using.\"           Medical Review of Systems:     He reports right middle finger pain after a fall shortly before admission.  A 10-point review of systems was completed and is otherwise negative with the exception of HPI.         Psychiatric History:     He has no prior history of psychiatric hospitalizations or mental health treatment of any kind.  He denies any history of suicide attempts.           Substance Use History:     He has been smoking and snorting Fentanyl for 6 years.  He has also used Percocet and Oxycodone in the past.  He reports he was using 10-20 Fentanyl pills per day prior to admission and that he has had withdrawal symptoms historically and is surprised he isn't experiencing any currently.  He started using cocaine about 1 year ago and after this he began experiencing auditory hallucinations.  He has been snorting cocaine once per day.  He used cannabis from ages 14 to 27 and says he hasn't used it since.  He smokes 1 pack of cigarettes per day.  No history of CD treatment.           Past Medical History:     GERD  Concussion as a child    No history of seizures.           Past Surgical History:     None         Allergies:     No known allergies           Medications:     No prior to admission medications          Social History:     He grew up in [place].  His father raised him and lives in the [place] area.  His mother has lived in [place] through much of his life and was not involved while he was growing up.  He has 1 brother and 3 sister.  He denies any history of abuse.  He is single.  He does not have children.  He lives with his sister and sister's 5-year-old daughter.  He graduated from high school.  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  He quit last fall.  When asked how he pays for drugs he refused to " "respond.  When asked if he sells drugs, he said, \"No comment.\"  He reports check fraud and felony drug possession charges this year and has court on 1/17.          Family History:     His aunt has a history of schizophrenia.  Family suspects his mother has bipolar disorder.  Both his parents have a history of substance abuse.           Labs:      Latest Reference Range & Units 12/16/22 23:46 12/16/22 23:47 12/17/22 01:17   Sodium 136 - 145 mmol/L   139   Potassium 3.5 - 5.0 mmol/L   3.9   Chloride 98 - 107 mmol/L   107   Carbon Dioxide 22 - 31 mmol/L   25   Urea Nitrogen 8 - 22 mg/dL   13   Creatinine 0.70 - 1.30 mg/dL   0.84   GFR Estimate >60 mL/min/1.73m2   >90   Calcium 8.5 - 10.5 mg/dL   9.1   Anion Gap 5 - 18 mmol/L   7   Albumin 3.5 - 5.0 g/dL   3.7   Protein Total 6.0 - 8.0 g/dL   6.8   Alkaline Phosphatase 45 - 120 U/L   55   ALT 0 - 45 U/L   19   AST 0 - 40 U/L   26   Bilirubin Total 0.0 - 1.0 mg/dL   0.6   Creatinine Urine mg/dL   596   Glucose 70 - 125 mg/dL   104   WBC 4.0 - 11.0 10e3/uL 10.9     Hemoglobin 13.3 - 17.7 g/dL 14.3     Hematocrit 40.0 - 53.0 % 42.3     Platelet Count 150 - 450 10e3/uL 328     RBC Count 4.40 - 5.90 10e6/uL 5.04     MCV 78 - 100 fL 84     MCH 26.5 - 33.0 pg 28.4     MCHC 31.5 - 36.5 g/dL 33.8     RDW 10.0 - 15.0 % 15.9 (H)     % Neutrophils % 80     % Lymphocytes % 14     % Monocytes % 5     % Eosinophils % 1     % Basophils % 0     Absolute Basophils 0.0 - 0.2 10e3/uL 0.0     Absolute Eosinophils 0.0 - 0.7 10e3/uL 0.1     Absolute Immature Granulocytes <=0.4 10e3/uL 0.0     Absolute Lymphocytes 0.8 - 5.3 10e3/uL 1.6     Absolute Monocytes 0.0 - 1.3 10e3/uL 0.5     % Immature Granulocytes % 0     Absolute Neutrophils 1.6 - 8.3 10e3/uL 8.7 (H)     Absolute NRBCs 10e3/uL 0.0     NRBCs per 100 WBC <1 /100 0     SARS CoV2 PCR Negative   Negative    Amphetamine Qual Urine Screen Negative    Screen Negative   Cocaine Qual Urine Screen Negative    Screen Positive !   Benzodiazepine " Qual Ur Screen Negative    Screen Negative   Opiates Qualitative Urine Screen Negative    Screen Negative   Cannabinoids Qual Urine Screen Negative    Screen Positive !   Methadone Qual Urine Screen Negative    Screen Negative   Barbiturates Qual Urine Screen Negative    Screen Negative   Pcp Qual Urine Screen Negative    Screen Negative   Oxycodone Qual Urine Screen Negative    Screen Negative   Acetaminophen 10.0 - 20.0 ug/mL   <3.0 (L)          Psychiatric Examination:     Appearance:  awake, alert and dressed in hospital scrubs  Attitude:  evasive and guarded  Eye Contact:  good  Mood:  anxious and down due to paranoia  Affect:  intensity is mildly heightened  Speech:  clear, coherent  Psychomotor Behavior:  no evidence of tardive dyskinesia, dystonia, or tics  Thought Process:  linear and goal oriented but often illogical  Associations:  no loose associations  Thought Content:  provides conflicting information regarding suicidal ideation, paranoia is present, reports auditory hallucinations of whistling, denies ideas of reference, denies thought broadcassting, denies homicidal ideation  Insight:  limited  Judgment:  limited  Oriented to:  date,  time, person, and place  Attention Span and Concentration:  fair  Recent and Remote Memory:  fair, difficult to thoroughly assess due to high suspicion for dishonesty  Language:  intact, fluent English  Fund of Knowledge:  appropriate  Muscle Strength and Tone:  normal  Gait and Station:  normal     BP (!) 154/81 (BP Location: Right arm, Patient Position: Sitting, Cuff Size: Adult Regular)   Pulse 80   Temp 97.4  F (36.3  C) (Temporal)   Resp 18   SpO2 97%          Physical Exam:     Please refer to the physical exam completed by Dr. Vasquez in the [facility] on 12/16/2022:    Constitutional:       General: He is not in acute distress.     Appearance: He is not diaphoretic.   HENT:      Head: Atraumatic.   Eyes:      General: No scleral icterus.      Pupils: Pupils are equal, round, and reactive to light.   Cardiovascular:      Rate and Rhythm: Normal rate and regular rhythm.      Heart sounds: Normal heart sounds.   Pulmonary:      Effort: No respiratory distress.      Breath sounds: Normal breath sounds.   Abdominal:      Palpations: Abdomen is soft.      Tenderness: There is no abdominal tenderness.   Musculoskeletal:         General: No tenderness.   Lymphadenopathy:      Cervical: No cervical adenopathy.   Skin:     General: Skin is warm.      Findings: No rash.

## 2022-12-19 NOTE — PROGRESS NOTES
Pt arrives on unit at 1330. Pt is assisted with security search by two male staff. Pt meets with provider soon after search is completed. He has now asked to take a shower.     Pt does not exhibit any signs of withdrawal, this was affirmed by provider who spent time with him.    Pt has been placed on 72 hour hold, per provider report, pt expressed frustration over legal hold. Will attempt to meet with pt once he is done showering and is feeling more settled.    Jenn Rodriguez RN

## 2022-12-19 NOTE — PHARMACY-ADMISSION MEDICATION HISTORY
Please see Admission Medication History note completed on 12/17/22 under previous encounter for information regarding prior to admission medications.    Paras Castaneda, PharmD, BCPS, BCPP

## 2022-12-19 NOTE — ED NOTES
"Call received from intake. Placement planned for [address]. Accepting provider Dr Pederson. Pt reports \"is ok\" with roommate. Pt notified there is no cell phone, no smoking and unit is locked. Pt agreeable.   "

## 2022-12-19 NOTE — TELEPHONE ENCOUNTER
No appropriate beds currently available within the  system. Bed search update ([facility]) @ 11:45PM:       [facility]: @ cap per website      [facility]: @ cap per website      [facility]: @ cap per website      [facility]: @ cap per website      [facility]: @ cap per website      [facility]: @ cap per website  [facility]: @ cap per website      [facility]: @ cap per website    Pt remains on work list until appropriate placement is available

## 2022-12-19 NOTE — TELEPHONE ENCOUNTER
R: pagejustyna Vilchis at 9:11 am.   Mame called back at 10:09 am but author was in a meeting. Paged Mame at 10:14 am.   Paged Mame at 11:06 am.   Per Mame at 11:18 am, she wants author to verify that pt is ok with having a roommate, and that he is ok that he will have no cell phone or ability to smoke and that unit is locked. She said if pt is ok with the above, she accepts pt. She requests author page her with the outcome as an FYI. Author called the ED RN at 11:22 am who said pt prefers not to have a roommate, but is agreeable to have one. She said he consents to the other issues stated above. Author informed Mame.  #32/Mame accepted for herself         Unit notified at 11:27 pm, er notified at 11:31 pm       hadley

## 2022-12-19 NOTE — TELEPHONE ENCOUNTER
R:  No appropriate beds within [facility].  Bed Search update within [facility] 8:45PM  [facility] [place] [facility]  [facility] beds available  [facility]-No beds available.  Spoke to [facility].  No beds until at least Tuesday.    [facility]-No beds available  [facility]- No beds available  [facility]-No beds available  [facility]-No beds available  [facility]-No beds available     Pt remains on work list awaiting appropriate placement.

## 2022-12-20 LAB
B BURGDOR IGG+IGM SER QL: 0.71
CHOLEST SERPL-MCNC: 177 MG/DL
FOLATE SERPL-MCNC: 19.3 NG/ML (ref 4.6–34.8)
HBA1C MFR BLD: 5.4 % (ref 0–5.6)
HDLC SERPL-MCNC: 34 MG/DL
HIV 1+2 AB+HIV1 P24 AG SERPL QL IA: NONREACTIVE
LDLC SERPL CALC-MCNC: 117 MG/DL
NONHDLC SERPL-MCNC: 143 MG/DL
T PALLIDUM AB SER QL: NONREACTIVE
TRIGL SERPL-MCNC: 130 MG/DL
TSH SERPL DL<=0.005 MIU/L-ACNC: 1.01 MU/L (ref 0.4–4)
VIT B12 SERPL-MCNC: 1017 PG/ML (ref 232–1245)

## 2022-12-20 PROCEDURE — 82607 VITAMIN B-12: CPT | Performed by: NURSE PRACTITIONER

## 2022-12-20 PROCEDURE — 86780 TREPONEMA PALLIDUM: CPT | Performed by: NURSE PRACTITIONER

## 2022-12-20 PROCEDURE — 36415 COLL VENOUS BLD VENIPUNCTURE: CPT | Performed by: NURSE PRACTITIONER

## 2022-12-20 PROCEDURE — 124N000002 HC R&B MH UMMC

## 2022-12-20 PROCEDURE — 82390 ASSAY OF CERULOPLASMIN: CPT | Performed by: NURSE PRACTITIONER

## 2022-12-20 PROCEDURE — 80061 LIPID PANEL: CPT | Performed by: NURSE PRACTITIONER

## 2022-12-20 PROCEDURE — 86038 ANTINUCLEAR ANTIBODIES: CPT | Performed by: NURSE PRACTITIONER

## 2022-12-20 PROCEDURE — 87389 HIV-1 AG W/HIV-1&-2 AB AG IA: CPT | Performed by: NURSE PRACTITIONER

## 2022-12-20 PROCEDURE — 83036 HEMOGLOBIN GLYCOSYLATED A1C: CPT | Performed by: NURSE PRACTITIONER

## 2022-12-20 PROCEDURE — 86618 LYME DISEASE ANTIBODY: CPT | Performed by: NURSE PRACTITIONER

## 2022-12-20 PROCEDURE — 82746 ASSAY OF FOLIC ACID SERUM: CPT | Performed by: NURSE PRACTITIONER

## 2022-12-20 PROCEDURE — 250N000013 HC RX MED GY IP 250 OP 250 PS 637: Performed by: NURSE PRACTITIONER

## 2022-12-20 PROCEDURE — 99232 SBSQ HOSP IP/OBS MODERATE 35: CPT | Performed by: NURSE PRACTITIONER

## 2022-12-20 PROCEDURE — 84443 ASSAY THYROID STIM HORMONE: CPT | Performed by: NURSE PRACTITIONER

## 2022-12-20 PROCEDURE — 83825 ASSAY OF MERCURY: CPT | Performed by: NURSE PRACTITIONER

## 2022-12-20 RX ADMIN — OLANZAPINE 5 MG: 5 TABLET, FILM COATED ORAL at 21:36

## 2022-12-20 ASSESSMENT — ACTIVITIES OF DAILY LIVING (ADL)
ADLS_ACUITY_SCORE: 28
DRESS: INDEPENDENT
ADLS_ACUITY_SCORE: 28
ORAL_HYGIENE: INDEPENDENT
ADLS_ACUITY_SCORE: 28
LAUNDRY: WITH SUPERVISION
HYGIENE/GROOMING: INDEPENDENT

## 2022-12-20 NOTE — PLAN OF CARE
Pt appeared to sleep 7 hours. No concerns reported. Pt woken up for labs and Opioid Withdrawal Scale at approximately 0620 hours. Pt agreeable to labs and vital signs. OWS score was 0. Safety checks done at least every 15 minutes. Pt polite and pleasant during interaction with lab staff and unit staff. Pt stated he was going to get more rest and returned to room.

## 2022-12-20 NOTE — PLAN OF CARE
"Initial Psychosocial Assessment:     I have reviewed the chart, met with the patient, and developed Care Plan.  Information for assessment was obtained from:   Chart review and patient interview        Presenting Problem:  Patient is a 32 year old male who uses he/ him pronoun who presented to [facility]  on 22 due to substance abuse, suicidal ideations, auditory hallucinations, and paranoia. He wrote a suicide letter to his father and attempted to overdose on Fentanyl. He lives with his sister and five year old niece. His sister reported that he nailed shut door and windows in their home. He called the police a few times due to believing that there was someone in the home and hearing animals I the attic. He reports to [facility], \" I was hearing things from the drugs. I heard whistling sounds.\" He notes he started using percocet's six years ago and then transitioned to coke and fentanyl. He notes that he does not why he has the urge to get up and use right away. He notes that his grandma  six years ago but that he is not grieving. He notes that he plans to quit. He is interested in CD treatment but doesn't want to remain in the hospital.       Recent stressors include: \" I dont want to be sick\" at first grieving my grandma\".  \" I want to be sober. The psychosis from the drugs were stressing me\" . \" I also have an ex girlfriend and home boys who are stalking me for reasons that I cant disclose\"     Goal Statement: \"Stay sober\"      Mental Status  He notes that his mood and appetite are \" okay\". He states that he couldn't sleep well but that the \" sleeping medications \" are helping. He feels that he is not going through withdrawals. When asked if he was aware of what he was taking, he responded, \" I dont know foreal what it was that I took honestly.\"       History of Mental Health and Chemical Dependency:  Mental Health History:  It is unknown if the patient has any prior mental health dx per " "chart. Pt reports that he has not history of mental illness.     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 He said that he wanted to say goodbye and took \" more pills than usual\" He states \" I want to live though. I want to be sober. I dont want to die\" . When asked if the care team could assist with an admission to treatment. He declined. He declined suicidal ideations now.    Previous psychiatric hospitalizations:   No history of psychiatric hospitalizations     Previous mental health services:  He notes \" I dont need therapy\". The  explained benefits of therapy and he noted that he would consider therapy. He states he is willing to work with a med management provider to prescribe \" sleeping pills'.     Previous/Current MI and/or CD Commitments:   No history of treatment       Chemical Dependency History:  Summary of use: He says that he uses fentanyl, coke and weed daily. He has been smoking and snorting Fentanyl for 6 years.  He reports he was using 10-20 Fentanyl pills per day prior to admission and that he has had withdrawal symptoms historically and is surprised he isn't experiencing any currently.  He started using cocaine about 1 year ago and after this he began experiencing auditory hallucinations.       Family Description ([facility], Family Psychiatric History):  He states both of his parents and other family members \" use drugs\". He notes that drugs are normalized in his family. He isnt aware of any family members dx with a mental illness     Environment and Living Situation:    He lives in an apartment with his sister and five year old niece in [place], [place]. He likes living with his sister and has no plans to move at " "this time. He notes not feeling safe at times due to people following him        Current Family Circumstances and Relationships Chilhood    Significant Life   He says he doesn't speak to his mother and that \" she was barely around\". He says that she lives in [place] and does not make any attempts to contact him. He states he is close to his dad and sister. He has two other siblings that live in [place] and [place]. He notes that he is from [place] and moved to [place] when he was 9 years old. He notes he later moved to [place] when he was 17 years old and has lived in [place] since. He is single and has no children. He notes that he does not feel he can have children. He states \" I tried but I wasn't able to . I dont think im fertile\". He notes that he loves his aunts and that they helped raise him. He describes his childhood as \" good' and thanked his aunt for raising him. He notes his parents were never .   He is single and never . He notes that his ex girlfriend is following him.      Living Situation:  He lives with his sister      Educational Background:  Graduated [employer]     Occupational History:  He stopped working in aug 2022. He was working at a  [employer] shop. He is currently not employed but notes that he is finds way to stay financially stable.       Financial Status:  Health insurance: No insurance   Income source: He did not disclose but notes he finds \" ways\" to make money.    Legal Issues:  Legal status during current admission: 72HH  Legal guardian: YEs  Criminal Concerns: No       Ethnic/Cultural Considerations:  Primary language: English   Requires : No   Cultural Influences: None noted      Spiritual Orientation:  \" I am not really spiritual \"        Service History:  No history      Social Functioning (organization, interests):  He said his sister is a good support. He likes watching tv and sports.       Current Treatment Providers are:  He has no current providers "     Social Service Assessment/Plan:   He will benefit from CD treatment- patient is declining assistance for CD treatment.   He will benefit from psych, case management, and therapy.      Team-Specific:   Patient will have psychiatric assessment and medication management by the psychiatrist. Medications will be reviewed and adjusted per DO/MD/APRN CNP as indicated. The treatment team will continue to assess and stabilize the patient's mental health symptoms with the use of medications and therapeutic programming. Hospital staff will provide a safe environment and a therapeutic milieu. Staff will continue to assess patient as needed. Patient will participate in unit groups and activities. Patient will receive individual and group support on the unit.      CTC will do individual inpatient treatment planning and after care planning. CTC will discuss options for increasing community supports with the patient. CTC will coordinate with outpatient providers and will place referrals to ensure appropriate follow up care is in place.

## 2022-12-20 NOTE — PLAN OF CARE
"    Tasks Complete:    CTC spoke with pt sister- Sister notes that she does not think the pt intentionally overdosed on drugs but has concerns regarding his drug use. She notes she feels his mental health symptoms are drug induced . She confirms that the pt does not own a gun. She states \" hes scaring the fuck outta me\". He nailed window shut and is paranoid . Family is confused regarding the suicide note left and thinks that he was more apologetic and that it was not a suicide note. She does not think pt needs civil commitment at this time.    CTC attempted to meet with pt but pt was observed sleeping; ctc will attempt to complete psycho social again tomorrow but will complete chart.     Post round meeting with team @9:30 am updates: PT is providing conflicting information than what he reported in the ED. He is minimizing his symptoms and circumstances leading to hospitalization. He is paranoid that people are following him in 4 different cars. He denies SI or the attempt to commit suicide. Petition for commitment and martin is being considered . Care team will speak with collateral.      Current Symptoms include the following: See RN note     Addressed patient needs/concerns: No needs or concerns noted at this time    Discharge Plan or Goal   Plan  Discharge home to [place]     Care Team   Sister Sheila Hernandez ([phone])     Barriers to Discharge   Ongoing stabilization   [facility]     Referral Status  He has not outpatient services      Legal Status  [facility]       "

## 2022-12-20 NOTE — PROGRESS NOTES
"[facility],  Psychiatric Progress Note      Impression:     Harry \"Marcio\" David Simon Is a 32-year-old male admitted to [facility] on 12/19/2022.  He was admitted as a voluntary patient through the [facility] ER due to substance abuse, suicidal ideation, auditory hallucinations and paranoia.  Per ER notes, he wrote a suicide letter to his father and reported that he overdosed on Fentanyl with suicidal intent on 12/16.  Per ER physician notes, \"Yesterday (12/16) at 10:30 - 11:30 PM, he took about 80 tablets of 30 mg Fentanyl.  He says he had intentions of suicidal ideation.\"  He reported suicidal intent with his overdose to multiple ER staff.  He lives with his sister and her 5-year-old daughter.  His sister provided collateral information and said that he nailed shut the doors and windows on her home.  He told an ER staff member that he called the police a few times due to believing there was someone in the home and police suggested that perhaps he was hearing animals in the attic.  UTOX was positive for cocaine and cannabinoids.  In the ER he admitted to use of cannabis, cocaine and Fentanyl.  He was not taking any medications prior to admission.     During the admission assessment, he said much of the above information is untrue and his sister is retaliating because the patient told their mother his sister lost her job while their mother was in town for Thanksgiving.  He said that he did not nail shut the windows and doors at his sister's home.  He does admit to writing a suicide not to his father.  He states that he did not overdose on Fentanyl with suicidal intent and that he only used his typical amount.  He said he has been smoking and snorting Fentanyl for 6 years.  He reports he was using 10-20 Fentanyl pills per day prior to admission.  His last use was 12/16 and he does not appear to be in withdrawal at this time.  He says " "he started using cocaine about 1 year ago and after this he began experiencing auditory hallucinations.  He said he has been snorting cocaine once per day.  He reports he used cannabis from ages 14 to 27 and says he hasn't used it since, and that his UTOX was positive because he was in the vicinity of people who were using.  He stated that he came to the hospital voluntarily and that he wants to leave because he is \"not crazy.\"  He said he wants to stop using and is considering CD treatment and thought he was being admitted to a CD treatment program, not a psychiatric unit.  Discussed that he is having psychotic symptoms, possibly related to substance use, but he refuses to believe this.  He states he will not take medications.  Provider then placed a 72-hour hold.    Since admission, Zyprexa was initiated.  PRNs of Zyprexa, Seroquel and Hydroxyzine were initiated.  He has been taking medications.  No behavioral issues have been noted.  He denies hallucinations.  He continues to have paranoia.  He denies suicidal ideation.           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  PRNs of Zyprexa, Seroquel and Hydroxyzine are available.      Discontinue opiate withdrawal scale.    First episode psychosis labs pending.     I progress continues and in the absence of any further safety concerns, will plan on discharge 12/22 AM.  He does not have any outpatient providers.  He said that he wants to stop using drugs and is considering CD treatment.         Attestation:  Patient has been seen and evaluated by me, Olya Serrano, APRN CNP  The patient was counseled on nature of illness and treatment plan/options  Care was coordinated with treatment team, phone call with patient's father " Harry Reyes ([phone]), left message for patient's sister        Clinical Global Impressions  First:  Considering your total clinical experience with this particular patient population, how severe are the patient's symptoms at this time?: 6 (12/19/22 1411)  Compared to the patient's condition at the START of treatment, this patient's condition is: 4 (12/19/22 1411)  Most recent:  Considering your total clinical experience with this particular patient population, how severe are the patient's symptoms at this time?: 6 (12/19/22 1411)  Compared to the patient's condition at the START of treatment, this patient's condition is: 4 (12/19/22 1411)            Interim History:     The patient's care was discussed with the treatment team and chart notes were reviewed.  Pt was documented as sleeping 7 hours during the overnight shift.  Opiate withdrawal scores have been low.  Pt did not attend groups yesterday but spent some time in the milieu.  He did take Zyprexa as prescribed and also took PRN Seroquel.  States he tolerated them well and did not experience side effects.  Pt continues to minimize symptoms and circumstances leading to admission.  Pt denies hallucinations.  He denies suicidal ideation.  Pt continues to believe that unknown people have been following him and intend to harm him.  Provider inquired as to whether he has conflicts with or owes money to any drug dealers.  Pt was evasive in his response though seems to be under the impression that the people he believes are following him are not doing so for this reason.  Provider informed him that it is possible he is being followed but unreasonable to believe that multiple cars on different days have been following him.  He responded that he has only seen 4 cars.  Provider inquired about a comment he made about a gun yesterday and asked whether he owns any.  Pt asked for provider to provide the exact quotation he utilized when making this statement and did not  "believe that provider had not written everything he had said verbatim.  Pt stated he does not own guns.  Pt did agree to sign an RAZ for his sister and father.      Provider left a message for his sister and spoke with his father who stated that pt develops psychotic symptoms when he uses cocaine.  He advocated for pt's discharge from the hospital, stating he needs to stop using drugs and seek CD treatment.  His father reported that pt had recently spent some time with him and he does not believe pt attempted suicide.  [facility] spoke with pt's sister who indicated that pt wrote a letter in which he said \"goodbye\" to family which included no statements specifically pertaining to suicide, and she did not express any concerns pertaining to his safety, other than the detrimental effects of drug use.  His sister also advocated for CD treatment.  Both his sister and father report he does not own guns.  Given the above information, the treatment team decided against pursuing a petition for commitment.        Provider then went to pt's room to provide an update and discuss options for CD treatment.  When provider entered his room, one of his hands was fully down the front of his pants while he was lying on his side and he did not change its position during the 5-minute conversation.  Pt insisted that he knew provider from having met provider previously, which is not the case.  Pt stated that he plans to stop using cocaine but is not interested in CD treatment.  Discussed that if no further behavioral or safety concerns are noted in the next couple days, then the treatment team will plan for discharge 12/22.            Medications:     Current Facility-Administered Medications   Medication     acetaminophen (TYLENOL) tablet 650 mg     alum & mag hydroxide-simethicone (MAALOX) suspension 30 mL     hydrOXYzine (ATARAX) tablet 25 mg     ibuprofen (ADVIL/MOTRIN) tablet 600 mg     loperamide (IMODIUM) capsule 2 mg     nicotine " polacrilex (NICORETTE) gum 4 mg     OLANZapine (zyPREXA) tablet 10 mg    Or     OLANZapine (zyPREXA) injection 10 mg     OLANZapine (zyPREXA) tablet 5 mg     ondansetron (ZOFRAN ODT) ODT tab 4 mg     QUEtiapine (SEROquel) tablet 50 mg     senna-docusate (SENOKOT-S/PERICOLACE) 8.6-50 MG per tablet 1 tablet             Allergies:   No Known Allergies         Psychiatric Examination:     Appearance:  awake, alert and dressed in hospital scrubs  Attitude:  evasive and guarded  Eye Contact:  good  Mood:  frustrated  Affect:  intensity is mildly heightened  Speech:  clear, coherent  Psychomotor Behavior:  no evidence of tardive dyskinesia, dystonia, or tics  Thought Process:  linear and goal oriented but often illogical  Associations:  no loose associations  Thought Content:  denies suicidal ideation, paranoia is present, denies hallucinations, denies ideas of reference, denies thought broadcasting, denies homicidal ideation  Insight:  limited  Judgment:  limited  Oriented to:  date,  time, person, and place  Attention Span and Concentration:  fair  Recent and Remote Memory:  fair, difficult to thoroughly assess due to high suspicion for dishonesty  Language:  intact, fluent English  Fund of Knowledge:  appropriate  Muscle Strength and Tone:  normal  Gait and Station:  normal     /77   Pulse 68   Temp 98.5  F (36.9  C) (Temporal)   Resp 18   SpO2 94%          Labs:     Recent Results (from the past 24 hour(s))   Hemoglobin A1c    Collection Time: 12/20/22  6:23 AM   Result Value Ref Range    Hemoglobin A1C 5.4 0.0 - 5.6 %   Lipid panel    Collection Time: 12/20/22  6:23 AM   Result Value Ref Range    Cholesterol 177 <200 mg/dL    Triglycerides 130 <150 mg/dL    Direct Measure HDL 34 (L) >=40 mg/dL    LDL Cholesterol Calculated 117 (H) <=100 mg/dL    Non HDL Cholesterol 143 (H) <130 mg/dL   TSH with free T4 reflex and/or T3 as indicated    Collection Time: 12/20/22  6:23 AM   Result Value Ref Range    TSH 1.01  0.40 - 4.00 mU/L   Treponema Abs w Reflex to RPR and Titer    Collection Time: 12/20/22  6:23 AM   Result Value Ref Range    Treponema Antibody Total Nonreactive Nonreactive   Lyme Disease Total Abs Bld with Reflex to Confirm CLIA    Collection Time: 12/20/22  6:23 AM   Result Value Ref Range    Lyme Disease Antibodies Total 0.71 <0.90   Vitamin B12    Collection Time: 12/20/22  6:23 AM   Result Value Ref Range    Vitamin B12 1,017 232 - 1,245 pg/mL   Folate    Collection Time: 12/20/22  6:23 AM   Result Value Ref Range    Folic Acid 19.3 4.6 - 34.8 ng/mL   HIV Antigen Antibody Combo    Collection Time: 12/20/22  6:23 AM   Result Value Ref Range    HIV Antigen Antibody Combo Nonreactive Nonreactive

## 2022-12-20 NOTE — PLAN OF CARE
Problem: Adult Behavioral Health Plan of Care  Goal: Adheres to Safety Considerations for Self and Others  Outcome: Progressing  Intervention: Develop and Maintain Individualized Safety Plan  Recent Flowsheet Documentation  Taken 12/19/2022 2000 by Promise Ugalde RN  Safety Measures: safety rounds completed   Goal Outcome Evaluation:    Plan of Care Reviewed With: patient      Pt was visible on the unit. Quite, guarded,and withdrawn. Watched TV. Dismissive with psych assessment questions. Has no insight into his Mental health, denied all psych symptoms. Ate 100% dinner and snack. Was cooperative and med compliant. Refused meds on day shift but was compliant on PM shift. PRN Seroquel 50 mg administered at bedtime for sleep. No verbal outbursts and no behavioral issues noted. COWS score was zero (0). Assess for Opioid withdrawal and update provider if COWS score is over 8. Labs were ordered for tomorrow morning. Will continue to monitor.      /77   Pulse 68   Temp 98.5  F (36.9  C) (Temporal)   Resp 18   SpO2 94%

## 2022-12-20 NOTE — PROGRESS NOTES
Pt has remained in his room most of the shift. He continues to deny pain, nausea, or any withdrawal symptoms. Pt appears comfortable. Pt is offered and declines a flu shot.     Pt denies psych symptoms. Pt is calm, pleasant, but provides limited information. Pt denies needing anything.     Jenn Rodriguez RN

## 2022-12-20 NOTE — PLAN OF CARE
Problem: Adult Behavioral Health Plan of Care  Goal: Adheres to Safety Considerations for Self and Others  Outcome: Progressing       Pt eats breakfast in his room. Pt is coop with morning lab draw. Pt's COWS score was 0 at 0620. No withdrawal symptoms noted during interaction while pt eating breakfast. Pt denies pain.     Room checks/sweeps performed per unit protocol.    Jenn Rodriguez RN

## 2022-12-21 LAB
ANA PAT SER IF-IMP: ABNORMAL
ANA SER QL IF: ABNORMAL
ANA TITR SER IF: ABNORMAL {TITER}
ARSENIC BLD-MCNC: <10 UG/L
LEAD BLDV-MCNC: <2 UG/DL
MERCURY BLD-MCNC: <2.5 UG/L

## 2022-12-21 PROCEDURE — 124N000002 HC R&B MH UMMC

## 2022-12-21 PROCEDURE — 99232 SBSQ HOSP IP/OBS MODERATE 35: CPT | Performed by: NURSE PRACTITIONER

## 2022-12-21 PROCEDURE — 250N000013 HC RX MED GY IP 250 OP 250 PS 637: Performed by: NURSE PRACTITIONER

## 2022-12-21 RX ORDER — QUETIAPINE FUMARATE 50 MG/1
50 TABLET, FILM COATED ORAL AT BEDTIME
Status: DISCONTINUED | OUTPATIENT
Start: 2022-12-21 | End: 2022-12-22 | Stop reason: HOSPADM

## 2022-12-21 RX ORDER — QUETIAPINE FUMARATE 50 MG/1
50 TABLET, FILM COATED ORAL
Status: DISCONTINUED | OUTPATIENT
Start: 2022-12-21 | End: 2022-12-22 | Stop reason: HOSPADM

## 2022-12-21 RX ADMIN — QUETIAPINE FUMARATE 50 MG: 50 TABLET ORAL at 21:15

## 2022-12-21 RX ADMIN — ACETAMINOPHEN 650 MG: 325 TABLET, FILM COATED ORAL at 18:00

## 2022-12-21 ASSESSMENT — ACTIVITIES OF DAILY LIVING (ADL)
ORAL_HYGIENE: INDEPENDENT
ADLS_ACUITY_SCORE: 28
HYGIENE/GROOMING: INDEPENDENT
ADLS_ACUITY_SCORE: 28
DRESS: INDEPENDENT
ADLS_ACUITY_SCORE: 28
ADLS_ACUITY_SCORE: 28

## 2022-12-21 NOTE — PROGRESS NOTES
"[facility],  Psychiatric Progress Note      Impression:     Harry \"Marcio\" David Simon Is a 32-year-old male admitted to [facility] on 12/19/2022.  He was admitted as a voluntary patient through the [facility] ER due to substance abuse, suicidal ideation, auditory hallucinations and paranoia.  Per ER notes, he wrote a suicide letter to his father and reported that he overdosed on Fentanyl with suicidal intent on 12/16.  Per ER physician notes, \"Yesterday (12/16) at 10:30 - 11:30 PM, he took about 80 tablets of 30 mg Fentanyl.  He says he had intentions of suicidal ideation.\"  He reported suicidal intent with his overdose to multiple ER staff.  He lives with his sister and her 5-year-old daughter.  His sister provided collateral information and said that he nailed shut the doors and windows on her home.  He told an ER staff member that he called the police a few times due to believing there was someone in the home and police suggested that perhaps he was hearing animals in the attic.  UTOX was positive for cocaine and cannabinoids.  In the ER he admitted to use of cannabis, cocaine and Fentanyl.  He was not taking any medications prior to admission.     During the admission assessment, he said much of the above information is untrue and his sister is retaliating because the patient told their mother his sister lost her job while their mother was in town for Thanksgiving.  He said that he did not nail shut the windows and doors at his sister's home.  He does admit to writing a suicide not to his father.  He states that he did not overdose on Fentanyl with suicidal intent and that he only used his typical amount.  He said he has been smoking and snorting Fentanyl for 6 years.  He reports he was using 10-20 Fentanyl pills per day prior to admission.  His last use was 12/16 and he does not appear to be in withdrawal at this time.  He says " "he started using cocaine about 1 year ago and after this he began experiencing auditory hallucinations.  He said he has been snorting cocaine once per day.  He reports he used cannabis from ages 14 to 27 and says he hasn't used it since, and that his UTOX was positive because he was in the vicinity of people who were using.  He stated that he came to the hospital voluntarily and that he wants to leave because he is \"not crazy.\"  He said he wants to stop using and is considering CD treatment and thought he was being admitted to a CD treatment program, not a psychiatric unit.  Discussed that he is having psychotic symptoms, possibly related to substance use, but he refuses to believe this.  He states he will not take medications.  Provider then placed a 72-hour hold.      Since admission, Zyprexa was initiated but then replaced with Seroquel, which was more beneficial for sleep.  PRNs of Zyprexa, Seroquel and Hydroxyzine were initiated.  He has been taking medications.  No behavioral issues have been noted.  He denies hallucinations.  He continues to have paranoia but is more open to accepting that these thoughts may be related to his cocaine use.  He denies suicidal ideation.           Diagnoses:     Cocaine-induced psychosis  Stimulant (cocaine) use disorder, moderate to severe  Opiate use disorder, moderate to severe  Nicotine use disorder         Plan:     Medications:  Discontinue scheduled Zyprexa and replace with scheduled Seroquel 50 mg at HS.  PRNs of Zyprexa, Seroquel and Hydroxyzine are available.      First episode psychosis labs pending.     If progress continues and in the absence of any further safety concerns, will plan on discharge 12/22 AM.  He does not have any outpatient providers.  He said that he wants to stop using drugs and is considering CD treatment.         Attestation:   Patient has been seen and evaluated by me, Olya Serrano, APRN CNP  The patient was counseled on nature of " "illness and treatment plan/options  Care was coordinated with treatment team        Clinical Global Impressions  First:  Considering your total clinical experience with this particular patient population, how severe are the patient's symptoms at this time?: 6 (22)  Compared to the patient's condition at the START of treatment, this patient's condition is: 4 (22)  Most recent:  Considering your total clinical experience with this particular patient population, how severe are the patient's symptoms at this time?: 6 (22)  Compared to the patient's condition at the START of treatment, this patient's condition is: 4 (22)            Interim History:     The patient's care was discussed with the treatment team and chart notes were reviewed.  Pt was documented as sleeping 7 hours during the overnight shift.  Pt took Zyprexa as prescribed last night.  States he had some difficulty sleeping.  Discussed option of replacing with Seroquel, and he was agreeable.  Pt was more cooperative and forthcoming today.  States he has been speaking with family and is considering CD treatment.  Pt stated, \"Maybe I did put myself in a paranoid state\" and is open to considering the possibility that no one was, in fact, following and intending to harm him.  Overall his thought process was more logical.  He denies hallucinations.  His mood is \"good.\"  He denies suicidal ideation.  Appetite was good yesterday but lower today.  72-hour hold will  tomorrow afternoon.  Discussed that if progress continues the treatment team will prepare him for discharge tomorrow, but that he may stay longer if he chooses to do so, particularly if he would like to transfer from the hospital to CD treatment.            Medications:     Current Facility-Administered Medications   Medication     acetaminophen (TYLENOL) tablet 650 mg     alum & mag hydroxide-simethicone (MAALOX) suspension 30 mL     hydrOXYzine (ATARAX) " "tablet 25 mg     ibuprofen (ADVIL/MOTRIN) tablet 600 mg     loperamide (IMODIUM) capsule 2 mg     nicotine polacrilex (NICORETTE) gum 4 mg     OLANZapine (zyPREXA) tablet 10 mg    Or     OLANZapine (zyPREXA) injection 10 mg     ondansetron (ZOFRAN ODT) ODT tab 4 mg     QUEtiapine (SEROquel) tablet 50 mg     QUEtiapine (SEROquel) tablet 50 mg     senna-docusate (SENOKOT-S/PERICOLACE) 8.6-50 MG per tablet 1 tablet             Allergies:   No Known Allergies         Psychiatric Examination:     Appearance:  awake, alert and dressed in hospital scrubs  Attitude:  more cooperative, less guarded  Eye Contact:  good  Mood:  \"good\"  Affect:  normal range  Speech:  clear, coherent  Psychomotor Behavior:  no evidence of tardive dyskinesia, dystonia, or tics  Thought Process:  linear and goal oriented but often illogical  Associations:  no loose associations  Thought Content:  denies suicidal ideation, paranoia is present but slightly reduced, denies hallucinations, denies ideas of reference, denies thought broadcasting, denies homicidal ideation  Insight:  partial  Judgment:  fair  Oriented to:  date, time, person, and place  Attention Span and Concentration:  fair  Recent and Remote Memory:  fair  Language:  intact, fluent English  Fund of Knowledge:  appropriate  Muscle Strength and Tone:  normal  Gait and Station:  normal     /77 (BP Location: Left arm, Patient Position: Sitting)   Pulse 95   Temp 97.8  F (36.6  C) (Tympanic)   Resp 18   SpO2 97%          Labs:     No results found for this or any previous visit (from the past 24 hour(s)).  "

## 2022-12-21 NOTE — DISCHARGE INSTRUCTIONS
Behavioral Discharge Planning and Instructions    Summary: You were admitted on 12/19/2022  duesubstance abuse, suicidal ideations, auditory hallucinations, and paranoia. You were treated by Olya Serrano NP and discharged on 12/22/2022 from [facility]  to Home.    Main Diagnosis:   Cocaine-induced psychosis  Stimulant (cocaine) use disorder, moderate to severe  Opiate use disorder, moderate to severe  Nicotine use disorder     Health Care Follow-up:   We assisted you with applying for healthcare insurance. You completed the referral process and discharged before we could provide you information regarding the status of the referral. According to our financial team you qualify for medical assistance and you should receive insurance soon. A card will be mailed to your home. Please call [facility] to receive an update regarding the status of this referral-  [phone]    Medication Management Appointment: We could not schedule you an outpatient appointment as you were not willing to remain inpatient until your insurance has been approved. With that said, the provider has given you refills. Please call the following clinics noted below to schedule a med management appointment so that you are able to receive medications ongoing as your refills will not be refilled ongoing.    [facility] - [facility]  Address: [address] [place] [address], [address], [place] [address]  Phone: [phone]  Fax: [phone]    [facility] [place] [facility]  Address: [address] [place] [address], [address]  [place] [address]  Phone: [phone]  Fax: [phone]    Additional  and recommendations    Substance use treatment services were recommended, but you have declined these services at this time. If at anytime after discharge you would like to pursue these services at [facility] please contact us at [phone]. Please call and state  that you want help getting into treatment and we  will assist you with the next steps.     Therapy- We recommend you see a license addiction counselor to assist with your sobriety. Please call the following companies to schedule an appointment . They will meet with you over the phone, computer or in person.     [facility] - [facility]  Address: [address], [place] [address]  Phone: [phone]  Fax: [phone]    [facility] [place] [facility]  Address: [address] [place] [address], [address]  [place] [address]  Phone: [phone]  Fax: [phone]    Case management: We recommend that you work with a  in the community.   What is it?  are healthcare professionals who serve as patient advocates to support, guide and coordinate care for people as they navigate their health and wellness journeys. They can assist you with housing, employment, cash assistant programs, etc. Call [facility] and ask for case management services - [phone]      Attend all scheduled appointments with your outpatient providers. Call at least 24 hours in advance if you need to reschedule an appointment to ensure continued access to your outpatient providers.     Major Treatments, Procedures and Findings:  You were provided with: a psychiatric assessment, assessed for medical stability, medication evaluation and/or management, group therapy, CD evaluation/assessment, milieu management, and medical interventions    Symptoms to Report: feeling more aggressive, increased confusion, losing more sleep, mood getting worse, or thoughts of suicide    Early warning signs can include: increased depression or anxiety sleep disturbances increased thoughts or behaviors of suicide or self-harm  increased unusual thinking, such as paranoia or hearing voices    Safety and Wellness:  Take all medicines as directed.  Make no changes unless your doctor suggests them.      Follow treatment recommendations.  Refrain from alcohol and  "non-prescribed drugs.  If there is a concern for safety, call 911.    Resources:   Crisis Intervention: 269.364.1070 or 998-349-3051 (TTY: 803.665.2222).  Call anytime for help.  National [place] on Mental Illness (www.[place].alon.org): 343.770.4862 or 449-200-9942.  [place] Association for Children's Mental Health (www.[facility].org): 801.646.4135.  Alcoholics Anonymous (www.alcoholics-anonymous.org): Check your phone book for your local chapter.  Suicide Awareness Voices of Education (SAVE) (www.save.org): 068-481-AUOA (4670)  National Suicide Prevention Line (www.mentalhealthmn.org): 024-056-AZZW (4521)  Mental Health Consumer/Survivor Network of [place] (www.mhcsn.net): 280.660.3202 or 922-447-1295  Mental Health Association of [place] (www.mentalhealth.org): 553.878.1867 or 283-213-5389  Self- Management and Recovery Training., [employer]-- Toll free: 512.579.3253  www.[employer].UserZoom  [facility] Crisis Response - Adult 167 942-9981  Crisis text line: Text \"[place]\" to 454088. Free, confidential, 24/7.  Crisis Intervention: 610.537.7015 or 911-685-7017. Call anytime for help.   [facility] Crisis Response Team - Child: [phone]    General Medication Instructions:   See your medication sheet(s) for instructions.   Take all medicines as directed.  Make no changes unless your doctor suggests them.   Go to all your doctor visits.  Be sure to have all your required lab tests. This way, your medicines can be refilled on time.  Do not use any drugs not prescribed by your doctor.  Avoid alcohol.    Advance Directives:   Scanned document on file with [facility]? Minor-N/A  Is document scanned? Minor-N/A  Honoring Choices Your Rights Handout: Minor - N/A  Was more information offered? Minor-N/A    The Treatment team has appreciated the opportunity to work with you. If you have any questions or concerns about your recent admission, you can contact the unit which can receive your call 24 hours a day, 7 days a " week. They will be able to get in touch with a Provider if needed. The unit number is [phone] .

## 2022-12-21 NOTE — PLAN OF CARE
Tasks Complete:    CTC met with pt to discuss social service plan and complete psycho social assessment. Pt agreed to meet with financial counseling to obtain insurance. CTC will assist with resources outpatient.     AVS completed       Post round meeting with team @9:30 am updates:   72-hour hold will  tomorrow afternoon.  Discussed that if progress continues the treatment team will prepare him for discharge tomorrow, but the treatment is encouraging patient to consider staying longer to assist with CD treatment referrals and placement     Current Symptoms include the following: See RN note      Addressed patient needs/concerns: No needs or concerns noted at this time     Discharge Plan or Goal   Plan  Discharge home to [facility] Team   Sister - Kyra David ([phone])     Barriers to Discharge   Ongoing stabilization   72HH     Referral Status  He has not outpatient services      Legal Status  72HH

## 2022-12-21 NOTE — PLAN OF CARE
Problem: Adult Behavioral Health Plan of Care  Goal: Plan of Care Review  Outcome: Progressing   Goal Outcome Evaluation:                  Pt was quiet, withdrawn and answered assessment questions. Has concerns that he is in opioid withdrawal. Said he felt briefly nauseated during dinner and it resolved and had no emesis. Denied having any bone aches or chills. Said his nose is running, pt given box of kleenex. Also reminded that comfort medication are available upon request should his stomach be upset, or he feel anxious. Pt appears comfortable, VSS. Denied pain, anxiety and depression, said no SI or H/A. Said he was admitted to the hospital because of hallucinations and said he is not having them at this time. Ate 50% of dinner. The plan of care was reviewed with the patient.   Pt was started on sexual precautions by provider-no aberrant behavior noted this shift.        abdominal pain

## 2022-12-21 NOTE — PLAN OF CARE
"Patient was withdrawn and isolative. He was in bed for most of the time except for meals and phone calls. Patient denied all mental health symptoms during assessment. Patient said his stomach hurt after eating breakfast this morning. No c/o stomach pain were made after lunch. Patient said she did not sleep \"at all\" last night despite night shift recording him sleeping 7 hours. Patient has Seroquel scheduled order now and prn order for insomnia. Zyprexa was discontinued. COWS was a 3 today.  Blood pressure 127/79, pulse 96, temperature 97.8  F (36.6  C), resp. rate 17, SpO2 97 %.             "

## 2022-12-22 VITALS
TEMPERATURE: 98.8 F | DIASTOLIC BLOOD PRESSURE: 83 MMHG | RESPIRATION RATE: 16 BRPM | OXYGEN SATURATION: 95 % | SYSTOLIC BLOOD PRESSURE: 120 MMHG | HEART RATE: 96 BPM

## 2022-12-22 LAB — CERULOPLASMIN SERPL-MCNC: 30 MG/DL (ref 20–60)

## 2022-12-22 PROCEDURE — 99239 HOSP IP/OBS DSCHRG MGMT >30: CPT | Performed by: NURSE PRACTITIONER

## 2022-12-22 RX ORDER — QUETIAPINE FUMARATE 50 MG/1
50 TABLET, FILM COATED ORAL AT BEDTIME
Qty: 30 TABLET | Refills: 2 | Status: SHIPPED | OUTPATIENT
Start: 2022-12-22

## 2022-12-22 ASSESSMENT — ACTIVITIES OF DAILY LIVING (ADL)
ADLS_ACUITY_SCORE: 28

## 2022-12-22 NOTE — PROGRESS NOTES
Pt was discharged home today via Cab.Thoughts clear and reality based. Denies any Mental health issues. Pt discharged with all his belongings, medications and AVS. All questions answered.

## 2022-12-22 NOTE — PROGRESS NOTES
Pt isolative most of the morning shift, laying down in his room. Pt presents with a flat blunt affect, mood is calm. Denies any depression, anxiety.  Pt denies any SI, SIB,HI, Hallucinations. Pt out in lounge watching tv with peers for a short while. Pt did not attend group today. Ate his breakfast. No meds scheduled at this time and no Prn's given. No other concerns. Will continue to monitor.

## 2022-12-22 NOTE — PLAN OF CARE
Tasks Complete:  Chart review  CTC  reviewed social service plan with pt       Post round meeting with team @9:30 am updates:   72-hour hold will  this afternoon.  He will discharge today, but the treatment is encouraging patient to consider staying longer to assist with CD treatment referrals and placement      Current Symptoms include the following: See RN note      Addressed patient needs/concerns: No needs or concerns noted at this time     Discharge Plan or Goal   Plan  Discharge home to [facility] Team    - Kyra Hernandez ([phone])     Barriers to Discharge   Ongoing stabilization   72HH     Referral Status  He has not outpatient services      Legal Status  72HH

## 2022-12-22 NOTE — PLAN OF CARE
"Pt denies SI.  Pt has been in bed all shift with exception of supper.  Pt reports some difficulty sleeping at night pt encouraged to come out of room, watch TV, ride the exercise bike. Pt encouraged to do some activity would help him sleep.  Pt reports plans on going to stay with sisters and \"doing CD tx at some point.\"  Pt been very pleasant and cooperative upon approach.  Pt reported headache requested PRN.  Pt given PRN Tylenol.  Pt reported relief.  Pt states has been drinking liquids.    Problem: Adult Behavioral Health Plan of Care  Goal: Plan of Care Review  Outcome: Not Progressing  Goal: Patient-Specific Goal (Individualization)  Description: You can add care plan individualizations to a care plan. Examples of Individualization might be:  \"Parent requests to be called daily at 9am for status\", \"I have a hard time hearing out of my right ear\", or \"Do not touch me to wake me up as it startles me\".  Outcome: Not Progressing  Goal: Individualized Daily Interaction Plan (IDIP)  Outcome: Not Progressing  Goal: Adheres to Safety Considerations for Self and Others  Outcome: Not Progressing  Intervention: Develop and Maintain Individualized Safety Plan  Recent Flowsheet Documentation  Taken 12/21/2022 1822 by Peg Vera RN  Safety Measures:   environmental rounds completed   safety rounds completed  Goal: Absence of New-Onset Illness or Injury  Outcome: Not Progressing  Intervention: Identify and Manage Fall Risk  Recent Flowsheet Documentation  Taken 12/21/2022 1822 by Peg Vera RN  Safety Measures:   environmental rounds completed   safety rounds completed  Goal: Optimized Coping Skills in Response to Life Stressors  Outcome: Not Progressing  Goal: Develops/Participates in Therapeutic [place] to Support Successful Transition  Outcome: Not Progressing     Problem: Suicidal Behavior  Goal: Suicidal Behavior is Absent or Managed  Outcome: Not Progressing   Goal Outcome Evaluation:                        "

## 2022-12-22 NOTE — DISCHARGE SUMMARY
"Psychiatric Discharge Summary    Blue Hernandez Jr. MRN# [id number]   Age: 32 year old YOB: 1990     Date of Admission:  12/19/2022  Date of Discharge:  12/22/2022  Admitting Physician:  Talisha Parks MD  Discharge Physician:  NATALY Moseley CNP (Contact: [phone])         Event Leading to Hospitalization:      From H&P 12/19/2022:    \"I was hearing things and I guess I just wanted to be safe.\"      Harry \"Marcio\" David Simon Is a 32-year-old male admitted to [address] on 12/19/2022.  He was admitted as a voluntary patient through the [facility] ER due to substance abuse, suicidal ideation, auditory hallucinations and paranoia.  Per ER notes, he wrote a suicide letter to his father and reported that he overdosed on Fentanyl with suicidal intent on 12/16.  Per ER physician notes, \"Yesterday (12/16) at 10:30 - 11:30 PM, he took about 80 tablets of 30 mg Fentanyl.  He says he had intentions of suicidal ideation.\"  He reported suicidal intent with his overdose to multiple ER staff.  He lives with his sister and her 5-year-old daughter.  His sister provided collateral information and said that he nailed shut the doors and windows on her home.  He told an ER staff member that he called the police a few times due to believing there was someone in the home and police suggested that perhaps he was hearing animals in the attic.  UTOX was positive for cocaine and cannabinoids.  In the ER he admitted to use of cannabis, cocaine and Fentanyl.  He was not taking any medications prior to admission.     During the admission assessment, he said much of the above information is untrue and his sister is retaliating because the patient told their mother his sister lost her job while their mother was in town for [place].  He said that he did not nail shut the windows and doors at his sister's home.  He does admit to writing a suicide not to his father.  " "He states that he did not overdose on Fentanyl with suicidal intent and that he only used his typical amount.  He said he has been smoking and snorting Fentanyl for 6 years.  He reports he was using 10-20 Fentanyl pills per day prior to admission.  His last use was 12/16 and he does not appear to be in withdrawal at this time.  He says he started using cocaine about 1 year ago and after this he began experiencing auditory hallucinations.  He said he has been snorting cocaine once per day.  He reports he used cannabis from ages 14 to 27 and says he hasn't used it since, and that his UTOX was positive because he was in the vicinity of people who were using.  He stated that he came to the hospital voluntarily and that he wants to leave because he is \"not crazy.\"  He said he wants to stop using and is considering CD treatment and thought he was being admitted to a CD treatment program, not a psychiatric unit.  Discussed that he is having psychotic symptoms, possibly related to substance use, but he refuses to believe this.  He states he will not take medications.  Provider then placed a 72-hour hold.        He reports that he \"never\" has suicidal ideation and that he wrote a suicide note \"because I was being followed\" and was considering killing himself before someone else could kill him.  He denies intent/plan.  He says he is \"not sure\" who would be following him.  He speculates it may be a \"lady I was messing around with\" because \"she texted me out of nowhere asking if I was okay.\"  He also speculates some enemies of a friend who stored belongings in his garage may have mistaken him for his friend.  He reports that he has witnessed people following him in cars \"every day for the past 2 months.\"  He reports that these vehicles are different each day.  He reports auditory hallucinations of whistling.  He denies visual hallucinations.  He denies ideas of reference and thought broadcasting.  He reports his mood is low due " "to concerns about being followed.  He reports that he was sleeping poorly while using cocaine but that his sleep the last 3 nights has been \"great.\"  He wasn't eating much while he was using cocaine and his appetite is now \"great.\"  He says he only lost 3-4# over the past year.  His concentration is \"great.\"  His energy level is \"spot on since I haven't been using.\"          See full admission note by Mame Serrano NP 12/19/2022 for details.          Diagnoses:     Cocaine-induced psychosis  Stimulant (cocaine) use disorder, moderate to severe  Opiate use disorder, moderate to severe  Nicotine use disorder         Labs:      Latest Reference Range & Units 12/16/22 23:46 12/16/22 23:47 12/17/22 01:17 12/20/22 06:23   Sodium 136 - 145 mmol/L   139    Potassium 3.5 - 5.0 mmol/L   3.9    Chloride 98 - 107 mmol/L   107    Carbon Dioxide 22 - 31 mmol/L   25    Urea Nitrogen 8 - 22 mg/dL   13    Creatinine 0.70 - 1.30 mg/dL   0.84    GFR Estimate >60 mL/min/1.73m2   >90    Calcium 8.5 - 10.5 mg/dL   9.1    Anion Gap 5 - 18 mmol/L   7    Albumin 3.5 - 5.0 g/dL   3.7    Protein Total 6.0 - 8.0 g/dL   6.8    Alkaline Phosphatase 45 - 120 U/L   55    ALT 0 - 45 U/L   19    AST 0 - 40 U/L   26    Arsenic <=12.0 ug/L    <10.0   Bilirubin Total 0.0 - 1.0 mg/dL   0.6    Cholesterol <200 mg/dL    177   Creatinine Urine mg/dL   596    Folate 4.6 - 34.8 ng/mL    19.3   HDL Cholesterol >=40 mg/dL    34 (L)   Hemoglobin A1C 0.0 - 5.6 %    5.4   LDL Cholesterol Calculated <=100 mg/dL    117 (H)   Lead Venous Blood <=4.9 ug/dL    <2.0   [place] <=10.0 ug/L    <2.5   Non HDL Cholesterol <130 mg/dL    143 (H)   Triglycerides <150 mg/dL    130   TSH 0.40 - 4.00 mU/L    1.01   Vitamin B12 232 - 1,245 pg/mL    1,017   Glucose 70 - 125 mg/dL   104    WBC 4.0 - 11.0 10e3/uL 10.9      Hemoglobin 13.3 - 17.7 g/dL 14.3      Hematocrit 40.0 - 53.0 % 42.3      Platelet Count 150 - 450 10e3/uL 328      RBC Count 4.40 - 5.90 10e6/uL 5.04      MCV 78 - " 100 fL 84      MCH 26.5 - 33.0 pg 28.4      MCHC 31.5 - 36.5 g/dL 33.8      RDW 10.0 - 15.0 % 15.9 (H)      % Neutrophils % 80      % Lymphocytes % 14      % Monocytes % 5      % Eosinophils % 1      % Basophils % 0      Absolute Basophils 0.0 - 0.2 10e3/uL 0.0      Absolute Eosinophils 0.0 - 0.7 10e3/uL 0.1      Absolute Immature Granulocytes <=0.4 10e3/uL 0.0      Absolute Lymphocytes 0.8 - 5.3 10e3/uL 1.6      Absolute Monocytes 0.0 - 1.3 10e3/uL 0.5      % Immature Granulocytes % 0      Absolute Neutrophils 1.6 - 8.3 10e3/uL 8.7 (H)      Absolute NRBCs 10e3/uL 0.0      NRBCs per 100 WBC <1 /100 0      Lyme Disease Antibodies Serum <0.90     0.71   Treponema Antibodies Nonreactive     Nonreactive   SARS CoV2 PCR Negative   Negative     HIV Antigen Antibody Combo Nonreactive     Nonreactive   Amphetamine Qual Urine Screen Negative    Screen Negative    Cocaine Qual Urine Screen Negative    Screen Positive !    Benzodiazepine Qual Ur Screen Negative    Screen Negative    Opiates Qualitative Urine Screen Negative    Screen Negative    Cannabinoids Qual Urine Screen Negative    Screen Positive !    Methadone Qual Urine Screen Negative    Screen Negative    Barbiturates Qual Urine Screen Negative    Screen Negative    Pcp Qual Urine Screen Negative    Screen Negative    Oxycodone Qual Urine Screen Negative    Screen Negative    ANTI NUCLEAR EMMETT IGG BY IFA WITH REFLEX     Rpt !   GASTON interpretation Negative     Borderline Positive !   GSATON pattern 1     Speckled   GASTON titer 1     1:40   Acetaminophen 10.0 - 20.0 ug/mL   <3.0 (L)           Consults:     No consultations were requested during this admission.         Hospital Course:     Blue Hernandez Jr. was admitted to [facility] with attending Talisha Parks DO, under the direct care of Mame Serrano NP as a voluntary patient.  He immediately requested discharge and was placed on a 72-hour hold.   The patient was placed under status 15 (15 minute checks) to  ensure patient safety.     Due to his reported use of Fentanyl, the opiate withdrawal scale was initiated.  His scores were low and he did not have any signs or symptoms of withdrawal.      First episode psychosis labs were unremarkable with the exception of borderline positive anti nuclear antibody, but he did not endorse any physical health symptoms indicative of an autoimmune disorder.      Zyprexa 5 mg at HS was initiated.  He tolerated it well but had difficulty sleeping, so it was replaced with Seroquel 50 mg.  He reports that he slept better with Seroquel, though felt drowsy for 1-2 hours upon waking in the AM.      A petition for commitment was initially considered.  Members of the treatment team spoke with both his sister and his father.  Both indicated no safety concerns for the patient nor anyone else.  They stated he did not have access to guns.  They stated that he develops psychotic symptoms when he uses cocaine.  They were hopeful that he would pursue CD treatment.      Blue ALEXIS David Nathanael did not participate in groups and was rarely visible in the milieu.  No behavioral issues were noted.  He was strongly encouraged to go to CD treatment.  He declined the opportunity to complete a CD evaluation and discharge directly to CD treatment.  He did state intent to abstain from substance use after discharge.  He appeared genuinely surprised that his UTOX was positive for cannabinoids but not opioids and admitted that he did not know specifically which substances he had been obtaining off the streets.      The patient's symptoms of psychosis improved.  He denied hallucinations.  He acknowledged the possibility that his belief that he was being followed and in danger of being harmed was a delusional thought related to cocaine use.  He denied suicidal ideation.  He stated his mood was euthymic.  Sleep was fair.  Appetite was good.     Blue Hernandez Jr.'s 72-hour hold was discontinued several hours  "before it was due to , and he was discharged to home with his sister.  At the time of discharge Blue Hernandez Jr. was determined to not be a danger to himself or others.          Discharge Medications:       Dose / Directions   QUEtiapine 50 MG tablet  Commonly known as: SEROquel  Used for: Cocaine-induced psychotic disorder    Dose: 50 mg  Take 1 tablet (50 mg) by mouth At Bedtime  Quantity: 30 tablet  Refills: 2          Psychiatric Examination:     Appearance:  awake, alert and dressed in hospital scrubs  Attitude:  more cooperative, less guarded  Eye Contact:  good  Mood:  \"good\"  Affect:  normal range  Speech:  clear, coherent  Psychomotor Behavior:  no evidence of tardive dyskinesia, dystonia, or tics  Thought Process:  linear and goal oriented but often illogical  Associations:  no loose associations  Thought Content:  denies suicidal ideation, paranoia is reduced, denies hallucinations, denies ideas of reference, denies thought broadcasting, denies homicidal ideation  Insight:  partial  Judgment:  fair  Oriented to:  date, time, person, and place  Attention Span and Concentration:  fair  Recent and Remote Memory:  fair  Language:  intact, fluent English  Fund of Knowledge:  appropriate  Muscle Strength and Tone:  normal  Gait and Station:  normal     /64   Pulse 111   Temp 99.5  F (37.5  C)   Resp 17   SpO2 96%          Discharge Plan:     Per Discharge AVS:    Summary: You were admitted on 2022 due to substance abuse, suicidal ideations, auditory hallucinations, and paranoia. You were treated by Olya Serrano NP and discharged on 2022 from [facility] to home.     Health Care Follow-up:   We assisted you with applying for healthcare insurance. You completed the referral process and discharged before we could provide you information regarding the status of the referral. According to our financial team you qualify for medical assistance and you should receive " insurance soon. A card will be mailed to your home. Please call [facility] to receive an update regarding the status of this referral-  [phone].    Medication Management Appointment: We could not schedule you an outpatient appointment as you were not willing to remain inpatient until your insurance has been approved. With that said, the provider has given you refills. Please call the following clinics noted below to schedule a med management appointment so that you are able to receive medications ongoing as your refills will not be refilled ongoing.    [facility]  Address: [address] [place] [address], [address] [address]  Phone: [phone]  Fax: [phone]    [facility]  Address: [address] [place] [address]  Phone: [phone]  Fax: [phone]    Additional  and recommendations      Substance use treatment services were recommended, but you have declined these services at this time. If at anytime after discharge you would like to pursue these services at [facility] please contact us at [phone]. Please call and state  that you want help getting into treatment and we will assist you with the next steps.       Therapy- We recommend you see a license addiction counselor to assist with your sobriety. Please call the following [place] to schedule an appointment . They will meet with you over the phone, computer or in person.     [facility]  Address: [address], [place] [address]  Phone: [phone]  Fax: [phone]    [facility] Office  Address: [address] [place] [address], [place] [id number]  Phone: [phone]  Fax: [phone]      Case management: We recommend that you work with a  in the community.   What is it?  are healthcare professionals who serve as patient advocates to support,  guide and coordinate care for people as they navigate their health and wellness journeys. They can assist you with housing, employment, cash assistant programs, etc. Call [facility] and ask for case management services - [phone]      He was provided with a 30-day supply of Seroquel plus two refills which should be adequate to treat substance-induced psychosis if he abstains from cocaine use.  He was advised to take his medications as prescribed and to abstain from alcohol and illicit substances.          Attestation:  The patient has been seen and evaluated by me, NATALY Moseley CNP   Discharge time > 30 minutes        Clinical Global Impressions  First:  Considering your total clinical experience with this particular patient population, how severe are the patient's symptoms at this time?: 6 (12/19/22 1411)  Compared to the patient's condition at the START of treatment, this patient's condition is: 4 (12/19/22 1411)  Most recent:  Considering your total clinical experience with this particular patient population, how severe are the patient's symptoms at this time?: 4 (12/22/22 1046)  Compared to the patient's condition at the START of treatment, this patient's condition is: 2 (12/22/22 1046)

## 2023-02-04 ENCOUNTER — HEALTH MAINTENANCE LETTER (OUTPATIENT)
Age: 33
End: 2023-02-04

## 2024-03-02 ENCOUNTER — HEALTH MAINTENANCE LETTER (OUTPATIENT)
Age: 34
End: 2024-03-02

## 2025-03-15 ENCOUNTER — HEALTH MAINTENANCE LETTER (OUTPATIENT)
Age: 35
End: 2025-03-15